# Patient Record
Sex: FEMALE | Race: BLACK OR AFRICAN AMERICAN | Employment: STUDENT | URBAN - METROPOLITAN AREA
[De-identification: names, ages, dates, MRNs, and addresses within clinical notes are randomized per-mention and may not be internally consistent; named-entity substitution may affect disease eponyms.]

---
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
## 2024-01-11 ENCOUNTER — ROUTINE PRENATAL (OUTPATIENT)
Dept: OBGYN | Age: 19
End: 2024-01-11
Payer: COMMERCIAL

## 2024-01-11 VITALS
WEIGHT: 123.7 LBS | SYSTOLIC BLOOD PRESSURE: 102 MMHG | DIASTOLIC BLOOD PRESSURE: 55 MMHG | BODY MASS INDEX: 21.23 KG/M2 | HEART RATE: 96 BPM

## 2024-01-11 DIAGNOSIS — Z59.00 HOMELESS: ICD-10-CM

## 2024-01-11 DIAGNOSIS — Z34.91 PRENATAL CARE, FIRST TRIMESTER: Primary | ICD-10-CM

## 2024-01-11 PROCEDURE — 99212 OFFICE O/P EST SF 10 MIN: CPT | Performed by: MIDWIFE

## 2024-01-11 PROCEDURE — G8484 FLU IMMUNIZE NO ADMIN: HCPCS | Performed by: MIDWIFE

## 2024-01-11 PROCEDURE — 4004F PT TOBACCO SCREEN RCVD TLK: CPT | Performed by: MIDWIFE

## 2024-01-11 PROCEDURE — G8420 CALC BMI NORM PARAMETERS: HCPCS | Performed by: MIDWIFE

## 2024-01-11 PROCEDURE — G8427 DOCREV CUR MEDS BY ELIG CLIN: HCPCS | Performed by: MIDWIFE

## 2024-01-11 SDOH — ECONOMIC STABILITY - HOUSING INSECURITY: HOMELESSNESS UNSPECIFIED: Z59.00

## 2024-01-11 NOTE — PROGRESS NOTES
Keagan Rivera is a  female 12w6d    PAST OB HISTORY  OB History    Para Term  AB Living   1 0 0 0 0 0   SAB IAB Ectopic Molar Multiple Live Births   0 0 0 0 0 0      # Outcome Date GA Lbr Italo/2nd Weight Sex Delivery Anes PTL Lv   1 Current                MEDICAL HISTORY  No past medical history on file.      No bleeding, cramping or leakage of fluid.   She does notreport nausea, vomiting    Pt states she quit smoking     The patient was seen and evaluated.   Alert, cooperative, oriented to time and Place  Abdomen soft non tender  Skin warm and dry, no peripheral edema noted.     Mother's Prenatal Vitals  BP: (!) 102/55  Weight - Scale: 56.1 kg (123 lb 11.2 oz)  Pulse: 96  Patient Position: Sitting    The following labs or Prenatal Panel results reviewed and discussed with patient.     Pre-Janey Labs:  CBC:  Lab Results   Component Value Date    WBC 12.9 (H) 2023    RBC 4.89 2023    HGB 12.3 2023    HCT 35.5 2023    MCV 72.6 (L) 2023    MCH 25.2 (L) 2023    MCHC 34.6 (H) 2023    RDW 18.2 (H) 2023     2023    MPV 9.5 2023     Urinalysis:   Lab Results   Component Value Date    COLORU Yellow 2023    CLARITYU TURBID (A) 2023    GLUCOSEU Negative 2023    BILIRUBINUR NEGATIVE 2023    KETUA TRACE (A) 2023    SPECGRAV 1.020 2023    BLOODU Negative 2023    PHUR 7.0 2023    PROTEINU Negative 2023    UROBILINOGEN 0.2 2023    NITRU NEGATIVE 2023    LEUKOCYTESUR NEGATIVE 2023    WBCUA 0 TO 5 2023    RBCUA 0 TO 2 2023    BACTERIA 1+ (A) 2023     TSH:  No results found for: \"TSH\"  RPR:  Lab Results   Component Value Date    LABRPR NONREACTIVE 2023     RUBELLA:  No components found for: \"RUBELLAIGGANTIBODY\"  HIV:  No results found for: \"HIV1X2\"  HEP B:  Lab Results   Component Value Date    HEPBSAG NONREACTIVE 2023     GC PROBE RNA: No results

## 2024-01-11 NOTE — PROGRESS NOTES
Patient alert and pleasant with no complaints.  Here today for routine prenatal visit.  Discharge instructions have been discussed with the patient. Patient advised to call our office with any questions or concerns.   Voiced understanding.

## 2024-01-12 PROBLEM — Z59.00 HOMELESS: Status: ACTIVE | Noted: 2024-01-12

## 2024-01-14 ENCOUNTER — APPOINTMENT (OUTPATIENT)
Dept: ULTRASOUND IMAGING | Age: 19
End: 2024-01-14
Payer: COMMERCIAL

## 2024-01-14 ENCOUNTER — HOSPITAL ENCOUNTER (EMERGENCY)
Age: 19
Discharge: HOME | End: 2024-01-14
Payer: COMMERCIAL

## 2024-01-14 VITALS
HEART RATE: 91 BPM | DIASTOLIC BLOOD PRESSURE: 72 MMHG | TEMPERATURE: 97.5 F | SYSTOLIC BLOOD PRESSURE: 112 MMHG | WEIGHT: 123 LBS | HEIGHT: 64 IN | BODY MASS INDEX: 21 KG/M2 | OXYGEN SATURATION: 100 % | RESPIRATION RATE: 14 BRPM

## 2024-01-14 DIAGNOSIS — Z36.9 ENCOUNTER FOR FETAL ULTRASOUND: ICD-10-CM
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
## 2024-01-14 PROCEDURE — 99284 EMERGENCY DEPT VISIT MOD MDM: CPT

## 2024-01-14 PROCEDURE — 76801 OB US < 14 WKS SINGLE FETUS: CPT

## 2024-01-14 PROCEDURE — 6370000000 HC RX 637 (ALT 250 FOR IP): Performed by: NURSE PRACTITIONER

## 2024-01-14 RX ORDER — ACETAMINOPHEN 325 MG/1
650 TABLET ORAL ONCE
Status: COMPLETED | OUTPATIENT
Start: 2024-01-14 | End: 2024-01-14

## 2024-01-14 RX ADMIN — ACETAMINOPHEN 650 MG: 325 TABLET ORAL at 12:33

## 2024-01-14 ASSESSMENT — PAIN - FUNCTIONAL ASSESSMENT: PAIN_FUNCTIONAL_ASSESSMENT: NONE - DENIES PAIN
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
## 2024-01-14 NOTE — DISCHARGE INSTRUCTIONS
NORMAL PREGNANCY ULTRASOUND.  TYLENOL FOR PAIN.  FOLLOW UP WITH OBGYN.  FOLLOW UP WITH YOUR PRIMARY DOCTOR. IF YOU DO NOT HAVE ONE, USE THE INFORMATION ATTACHED TO ESTABLISH CARE.  RETURN FOR WORSENING SYMPTOMS.

## 2024-01-15 ENCOUNTER — TELEPHONE (OUTPATIENT)
Dept: OBGYN | Age: 19
End: 2024-01-15

## 2024-01-15 NOTE — TELEPHONE ENCOUNTER
Patient called regarding housing. Patient voiced she is currently homeless and in need of housing. I explained [facility] program and patient voiced she was interested in enrolling. I made a referral out to [facility] for assistance. I also talked with patient about the [facility] and the assistance they provide. Patient was interested in getting on the wait list.I tried calling [facility] but no response. I will try calling the shelter again today.

## 2024-01-24 ENCOUNTER — TELEPHONE (OUTPATIENT)
Dept: INTERNAL MEDICINE | Age: 19
End: 2024-01-24

## 2024-01-24 NOTE — TELEPHONE ENCOUNTER
LAW made contact to pt related to referral. Pt is currently staying with her boyfriend's sister. Pt reports she needs assistance with housing resources. [facility] to route to Medical Social Work. Pt reported no behavioral health needs at this time. [facility] to route to hospitals

## 2024-02-07 ENCOUNTER — ANCILLARY PROCEDURE (OUTPATIENT)
Dept: OBGYN CLINIC | Age: 19
End: 2024-02-07
Payer: COMMERCIAL

## 2024-02-07 ENCOUNTER — INITIAL PRENATAL (OUTPATIENT)
Dept: OBGYN CLINIC | Age: 19
End: 2024-02-07
Payer: COMMERCIAL

## 2024-02-07 VITALS
BODY MASS INDEX: 21.63 KG/M2 | DIASTOLIC BLOOD PRESSURE: 65 MMHG | HEART RATE: 69 BPM | WEIGHT: 126 LBS | SYSTOLIC BLOOD PRESSURE: 99 MMHG

## 2024-02-07 DIAGNOSIS — F12.10 MARIJUANA ABUSE: ICD-10-CM

## 2024-02-07 DIAGNOSIS — Z72.0 VAPES NICOTINE CONTAINING SUBSTANCE: ICD-10-CM

## 2024-02-07 DIAGNOSIS — F12.90 MARIJUANA USE DURING PREGNANCY: ICD-10-CM

## 2024-02-07 DIAGNOSIS — O09.891 HIGH RISK TEEN PREGNANCY IN FIRST TRIMESTER: ICD-10-CM

## 2024-02-07 DIAGNOSIS — O99.312: Primary | ICD-10-CM

## 2024-02-07 DIAGNOSIS — O99.320 MARIJUANA USE DURING PREGNANCY: ICD-10-CM

## 2024-02-07 DIAGNOSIS — O99.311 ALCOHOL CONSUMPTION DURING PREGNANCY IN FIRST TRIMESTER: ICD-10-CM

## 2024-02-07 DIAGNOSIS — O35.BXX0 FETAL CARDIAC ECHOGENIC FOCUS, ANTEPARTUM, SINGLE OR UNSPECIFIED FETUS: ICD-10-CM

## 2024-02-07 DIAGNOSIS — O09.892 HIGH RISK TEEN PREGNANCY IN SECOND TRIMESTER: ICD-10-CM

## 2024-02-07 DIAGNOSIS — Z3A.16 16 WEEKS GESTATION OF PREGNANCY: ICD-10-CM

## 2024-02-07 DIAGNOSIS — Z59.00 HOMELESS: ICD-10-CM

## 2024-02-07 DIAGNOSIS — D58.2 HEMOGLOBIN C TRAIT (HCC): ICD-10-CM

## 2024-02-07 DIAGNOSIS — D50.9 HYPOCHROMIC MICROCYTIC ANEMIA: ICD-10-CM

## 2024-02-07 LAB
GLUCOSE URINE, POC: NEGATIVE
PROTEIN UA: NEGATIVE

## 2024-02-07 PROCEDURE — 81002 URINALYSIS NONAUTO W/O SCOPE: CPT | Performed by: OBSTETRICS & GYNECOLOGY

## 2024-02-07 PROCEDURE — 76805 OB US >/= 14 WKS SNGL FETUS: CPT | Performed by: OBSTETRICS & GYNECOLOGY

## 2024-02-07 PROCEDURE — 36415 COLL VENOUS BLD VENIPUNCTURE: CPT | Performed by: OBSTETRICS & GYNECOLOGY

## 2024-02-07 PROCEDURE — 99203 OFFICE O/P NEW LOW 30 MIN: CPT | Performed by: OBSTETRICS & GYNECOLOGY

## 2024-02-07 SDOH — ECONOMIC STABILITY - HOUSING INSECURITY: HOMELESSNESS UNSPECIFIED: Z59.00

## 2024-02-07 NOTE — PROGRESS NOTES
24    Edwige Cunha APRN - CNM  [address] [place] [address]     RE:  KEAGAN SHEIKH  : 2005   AGE: 18 y.o.    This report has been created using voice recognition software. It may contain errors which are inherent in voice recognition technology.    Dear Dr. Cunha:    Keagan Sheikh had an appointment today for the following indications:    Patient Active Problem List   Diagnosis    Marijuana use during pregnancy    Alcohol consumption during pregnancy in first trimester    Vapes nicotine containing substance    High risk teen pregnancy in first trimester    Homeless    Fetal cardiac echogenic focus, antepartum    Hemoglobin C trait (HCC)     Keagan Sheikh is a 18 y.o. female, who is G1(0). She has an Estimated Date of Delivery: 24 based on her established dates.  She is currently 16 weeks 5 days gestation based on that assessment.     I discussed the [id number] screen with the patient. I advised her that this a screening test not a diagnostic test. I advised her that the test screens only for trisomy 21, 18 and 13. We discussed the sensitivity of the test which I advised the patient is as follows:      99.1% for detection of trisomy 21 with a specificity of 99.9%   >99.9% for trisomy 18 with a specificity of 99.6%     91.7% for trisomy 13 with a specificity of 99.7%     The patient understands that she may be a candidate for diagnostic genetic testing regardless of the results of her screen. Genetics. She understands that the [id number] testing does not replace diagnostic genetic testing.      Limitations of the test as stated by [employer]:  While the results of these tests are highly reliable, discordant results, including an accurate fetal sex prediction, may occur due to placental, maternal or fetal mosaicism or neoplasm, vanishing twin; and or maternal organ transplant, or other causes.  These tests are screening tests and not diagnostic.  They do not

## 2024-02-07 NOTE — PROGRESS NOTES
Pt here as a new patient.  Pt needs a prenatal sent to her pharmacy and something for BV and yeast. Pt denies bleeding/cramping/lof.

## 2024-02-09 RX ORDER — PNV NO.95/FERROUS FUM/FOLIC AC 28MG-0.8MG
1 TABLET ORAL
Qty: 30 TABLET | Refills: 12 | Status: CANCELLED | OUTPATIENT
Start: 2024-02-09

## 2024-02-09 RX ORDER — PNV NO.95/FERROUS FUM/FOLIC AC 28MG-0.8MG
1 TABLET ORAL
Qty: 30 TABLET | Refills: 12 | Status: SHIPPED | OUTPATIENT
Start: 2024-02-09

## 2024-02-09 NOTE — TELEPHONE ENCOUNTER
Patient called she had initial prenatal appt 2/7 with [facility].  Patient states she was supposed to be prescribed prenatal, something for yeast infection and BV

## 2024-02-13 ENCOUNTER — ROUTINE PRENATAL (OUTPATIENT)
Dept: OBGYN | Age: 19
End: 2024-02-13
Payer: COMMERCIAL

## 2024-02-13 VITALS
HEART RATE: 104 BPM | SYSTOLIC BLOOD PRESSURE: 110 MMHG | DIASTOLIC BLOOD PRESSURE: 69 MMHG | WEIGHT: 130.4 LBS | BODY MASS INDEX: 22.38 KG/M2

## 2024-02-13 DIAGNOSIS — O23.592 VAGINITIS AFFECTING PREGNANCY IN SECOND TRIMESTER, ANTEPARTUM: Primary | ICD-10-CM

## 2024-02-13 DIAGNOSIS — Z3A.17 17 WEEKS GESTATION OF PREGNANCY: ICD-10-CM

## 2024-02-13 LAB
GLUCOSE URINE, POC: NEGATIVE
PROTEIN UA: NEGATIVE

## 2024-02-13 PROCEDURE — 1036F TOBACCO NON-USER: CPT | Performed by: NURSE PRACTITIONER

## 2024-02-13 PROCEDURE — G8427 DOCREV CUR MEDS BY ELIG CLIN: HCPCS | Performed by: NURSE PRACTITIONER

## 2024-02-13 PROCEDURE — G8484 FLU IMMUNIZE NO ADMIN: HCPCS | Performed by: NURSE PRACTITIONER

## 2024-02-13 PROCEDURE — G8420 CALC BMI NORM PARAMETERS: HCPCS | Performed by: NURSE PRACTITIONER

## 2024-02-13 PROCEDURE — 99213 OFFICE O/P EST LOW 20 MIN: CPT | Performed by: NURSE PRACTITIONER

## 2024-02-13 PROCEDURE — 81002 URINALYSIS NONAUTO W/O SCOPE: CPT | Performed by: NURSE PRACTITIONER

## 2024-02-13 RX ORDER — METRONIDAZOLE 500 MG/1
500 TABLET ORAL 2 TIMES DAILY
Qty: 14 TABLET | Refills: 0 | Status: SHIPPED | OUTPATIENT
Start: 2024-02-13 | End: 2024-02-20

## 2024-02-13 NOTE — PROGRESS NOTES
Patient alert and pleasant with complaints of discharge and odor.  Here today for routine prenatal visit.  [facility] Rx specimen obtained, labeled and sent to lab. Discharge instructions have been discussed with the patient. Patient advised to call our office with any questions or concerns. Voiced understanding.

## 2024-02-13 NOTE — PROGRESS NOTES
Keagan Rivera is a  female 17w4d    She has complaints today of vaginal discharge, itching and odor. She states she gets reccurrent bv however the itching is new. She denies cramping or vaginal bleeding.   PAST OB HISTORY  OB History    Para Term  AB Living   1 0 0 0 0 0   SAB IAB Ectopic Molar Multiple Live Births   0 0 0 0 0 0      # Outcome Date GA Lbr Italo/2nd Weight Sex Delivery Anes PTL Lv   1 Current                MEDICAL HISTORY  Past Medical History:   Diagnosis Date    Migraine         There was Positive fetal movements. No bleeding, cramping or leakage of fluid.   She does notreport nausea, vomiting    The patient was seen and evaluated.   Alert, cooperative, oriented to time and Place  Abdomen soft non tender  Skin warm and dry, no peripheral edema noted.          The following labs or Prenatal Panel results reviewed and discussed with patient.     Pre- Labs:  CBC:  Lab Results   Component Value Date    WBC 12.9 (H) 2023    RBC 4.89 2023    HGB 12.3 2023    HCT 35.5 2023    MCV 72.6 (L) 2023    MCH 25.2 (L) 2023    MCHC 34.6 (H) 2023    RDW 18.2 (H) 2023     2023    MPV 9.5 2023     Urinalysis:   Lab Results   Component Value Date    COLORU Yellow 2023    CLARITYU TURBID (A) 2023    GLUCOSEU Negative 2024    BILIRUBINUR NEGATIVE 2023    KETUA TRACE (A) 2023    SPECGRAV 1.020 2023    BLOODU Negative 2023    PHUR 7.0 2023    PROTEINU Negative 2024    UROBILINOGEN 0.2 2023    NITRU NEGATIVE 2023    LEUKOCYTESUR NEGATIVE 2023    WBCUA 0 TO 5 2023    RBCUA 0 TO 2 2023    BACTERIA 1+ (A) 2023     TSH:  No results found for: \"TSH\"  RPR:  Lab Results   Component Value Date    LABRPR NONREACTIVE 2023     RUBELLA:  No components found for: \"RUBELLAIGGANTIBODY\"  HIV:  No results found for: \"HIV1X2\"  HEP B:  Lab Results

## 2024-02-13 NOTE — PATIENT INSTRUCTIONS
Housing for elderly, disabled, handicapped, moderate and low-income families; rental assistance under [address] program (Housing Choice Voucher Program). Call for application information.  [facility] Phone number: [phone]  Website: [employer]  [facility] Phone Number: [phone]  Website: [employer]  [facility] Phone Number: [phone]  Website: [employer]    [facility]:  What they offer: shelter for victims of domestic violence in [facility]  Phone Number: [phone]  Website: [employer]    [facility] DOMESTIC VIOLENCE SERVICES:  What they offer: shelter for victims of domestic violence in [facility]  Phone Number: [phone]  Website: [email or web address][email or web address]    HOMELESS PRO[facility]  What they offer: Assists homeless persons or families that lack a fixed or regular nighttime residence; shelter houses homeless from 3-30 days. Extended stay optional depending upon housing situation  PHONE Number: [phone], [phone]  Website: [employer]aaofcc[employer][email or web address]    [facility]:  What they offer: Temporary shelter for homeless persons or families  Phone: [phone]  Website: rescuemissionmv.[email or web address]    [facility]:  What they offer: Temporary shelter with hot meals for homeless persons 18 and older and for families.  Phone: [phone]  Website: [employer]  [employer]  What they offer: Emergency shelter for LGBTQ+ community and other resources  Contact: [phone]; [address]:   What they offer: Disaster Relief, Domestic Violence, Emergency Assistance, Family Support, Senior Support    [facility]  Contact: [phone]; [address] [place] [address]    [facility]  Contact: [phone]    [facility]  Contact: [phone]  [facility] Food Resources*     [facility]:    What they do: Provides 24-hr, 7 days a week

## 2024-02-14 ENCOUNTER — TELEPHONE (OUTPATIENT)
Dept: OBGYN | Age: 19
End: 2024-02-14

## 2024-02-15 RX ORDER — PNV NO.95/FERROUS FUM/FOLIC AC 28MG-0.8MG
1 TABLET ORAL
Qty: 30 TABLET | Refills: 12 | Status: SHIPPED | OUTPATIENT
Start: 2024-02-15

## 2024-03-01 ENCOUNTER — HOSPITAL ENCOUNTER (OUTPATIENT)
Age: 19
Discharge: HOME | End: 2024-03-01
Payer: COMMERCIAL

## 2024-03-01 DIAGNOSIS — D58.2 HEMOGLOBIN C TRAIT (HCC): ICD-10-CM

## 2024-03-01 DIAGNOSIS — D50.9 HYPOCHROMIC MICROCYTIC ANEMIA: ICD-10-CM

## 2024-03-01 LAB
ALBUMIN SERPL-MCNC: 3.7 G/DL (ref 3.5–5.2)
ALP SERPL-CCNC: 59 U/L (ref 35–104)
ALT SERPL-CCNC: 84 U/L (ref 0–32)
ANION GAP SERPL CALCULATED.3IONS-SCNC: 15 MMOL/L (ref 7–16)
AST SERPL-CCNC: 70 U/L (ref 0–31)
BILIRUB SERPL-MCNC: 0.5 MG/DL (ref 0–1.2)
BUN SERPL-MCNC: 3 MG/DL (ref 6–20)
CALCIUM SERPL-MCNC: 9.2 MG/DL (ref 8.6–10.2)
CHLORIDE SERPL-SCNC: 100 MMOL/L (ref 98–107)
CO2 SERPL-SCNC: 19 MMOL/L (ref 22–29)
CREAT SERPL-MCNC: 0.5 MG/DL (ref 0.5–1)
FERRITIN SERPL-MCNC: 11 NG/ML
FOLATE SERPL-MCNC: 14.4 NG/ML (ref 4.8–24.2)
GFR SERPL CREATININE-BSD FRML MDRD: >60 ML/MIN/1.73M2
GLUCOSE SERPL-MCNC: 98 MG/DL (ref 74–99)
IMM RETICS NFR: 27.3 % (ref 3–15.9)
IRON SATN MFR SERPL: 8 % (ref 15–50)
IRON SERPL-MCNC: 34 UG/DL (ref 37–145)
POTASSIUM SERPL-SCNC: 3.5 MMOL/L (ref 3.5–5)
PROT SERPL-MCNC: 7.1 G/DL (ref 6.4–8.3)
RETIC HEMOGLOBIN: 29.7 PG (ref 28.2–36.6)
RETICS # AUTO: 0.09 M/UL
RETICS/RBC NFR AUTO: 2.3 % (ref 0.4–1.9)
SODIUM SERPL-SCNC: 134 MMOL/L (ref 132–146)
TIBC SERPL-MCNC: 413 UG/DL (ref 250–450)
VIT B12 SERPL-MCNC: 401 PG/ML (ref 211–946)

## 2024-03-01 PROCEDURE — 80053 COMPREHEN METABOLIC PANEL: CPT

## 2024-03-01 PROCEDURE — 82607 VITAMIN B-12: CPT

## 2024-03-01 PROCEDURE — 83550 IRON BINDING TEST: CPT

## 2024-03-01 PROCEDURE — 83540 ASSAY OF IRON: CPT

## 2024-03-01 PROCEDURE — 36415 COLL VENOUS BLD VENIPUNCTURE: CPT

## 2024-03-01 PROCEDURE — 85045 AUTOMATED RETICULOCYTE COUNT: CPT

## 2024-03-01 PROCEDURE — 82746 ASSAY OF FOLIC ACID SERUM: CPT

## 2024-03-01 PROCEDURE — 82728 ASSAY OF FERRITIN: CPT

## 2024-03-04 DIAGNOSIS — D50.9 IRON DEFICIENCY ANEMIA, UNSPECIFIED IRON DEFICIENCY ANEMIA TYPE: Primary | ICD-10-CM

## 2024-03-04 RX ORDER — ALBUTEROL SULFATE 90 UG/1
4 AEROSOL, METERED RESPIRATORY (INHALATION) PRN
OUTPATIENT
Start: 2024-03-04

## 2024-03-04 RX ORDER — DIPHENHYDRAMINE HYDROCHLORIDE 50 MG/ML
50 INJECTION INTRAMUSCULAR; INTRAVENOUS
OUTPATIENT
Start: 2024-03-04

## 2024-03-04 RX ORDER — ACETAMINOPHEN 325 MG/1
650 TABLET ORAL
OUTPATIENT
Start: 2024-03-04

## 2024-03-04 RX ORDER — EPINEPHRINE 1 MG/ML
0.3 INJECTION, SOLUTION, CONCENTRATE INTRAVENOUS PRN
OUTPATIENT
Start: 2024-03-04

## 2024-03-04 RX ORDER — SODIUM CHLORIDE 0.9 % (FLUSH) 0.9 %
5-40 SYRINGE (ML) INJECTION PRN
OUTPATIENT
Start: 2024-03-04

## 2024-03-04 RX ORDER — ONDANSETRON 2 MG/ML
8 INJECTION INTRAMUSCULAR; INTRAVENOUS
OUTPATIENT
Start: 2024-03-04

## 2024-03-04 RX ORDER — SODIUM CHLORIDE 9 MG/ML
INJECTION, SOLUTION INTRAVENOUS CONTINUOUS
OUTPATIENT
Start: 2024-03-04

## 2024-03-06 ENCOUNTER — ROUTINE PRENATAL (OUTPATIENT)
Dept: OBGYN CLINIC | Age: 19
End: 2024-03-06
Payer: COMMERCIAL

## 2024-03-06 ENCOUNTER — ANCILLARY PROCEDURE (OUTPATIENT)
Dept: OBGYN CLINIC | Age: 19
End: 2024-03-06
Payer: COMMERCIAL

## 2024-03-06 VITALS
DIASTOLIC BLOOD PRESSURE: 70 MMHG | HEIGHT: 64 IN | SYSTOLIC BLOOD PRESSURE: 106 MMHG | HEART RATE: 83 BPM | WEIGHT: 133 LBS | OXYGEN SATURATION: 98 % | BODY MASS INDEX: 22.71 KG/M2

## 2024-03-06 DIAGNOSIS — O99.320 MARIJUANA USE DURING PREGNANCY: ICD-10-CM

## 2024-03-06 DIAGNOSIS — R74.8 ELEVATED LIVER ENZYMES: ICD-10-CM

## 2024-03-06 DIAGNOSIS — R82.5 POSITIVE URINE DRUG SCREEN: ICD-10-CM

## 2024-03-06 DIAGNOSIS — D58.2 HEMOGLOBIN C TRAIT (HCC): ICD-10-CM

## 2024-03-06 DIAGNOSIS — O35.BXX0 FETAL CARDIAC ECHOGENIC FOCUS, ANTEPARTUM, SINGLE OR UNSPECIFIED FETUS: ICD-10-CM

## 2024-03-06 DIAGNOSIS — D50.8 IRON DEFICIENCY ANEMIA SECONDARY TO INADEQUATE DIETARY IRON INTAKE: ICD-10-CM

## 2024-03-06 DIAGNOSIS — O09.892 HIGH RISK TEEN PREGNANCY IN SECOND TRIMESTER: ICD-10-CM

## 2024-03-06 DIAGNOSIS — D50.9 HYPOCHROMIC MICROCYTIC ANEMIA: ICD-10-CM

## 2024-03-06 DIAGNOSIS — O99.311 ALCOHOL CONSUMPTION DURING PREGNANCY IN FIRST TRIMESTER: Primary | ICD-10-CM

## 2024-03-06 DIAGNOSIS — Z72.0 VAPES NICOTINE CONTAINING SUBSTANCE: ICD-10-CM

## 2024-03-06 DIAGNOSIS — F12.90 MARIJUANA USE DURING PREGNANCY: ICD-10-CM

## 2024-03-06 DIAGNOSIS — O09.891 HIGH RISK TEEN PREGNANCY IN FIRST TRIMESTER: ICD-10-CM

## 2024-03-06 LAB
GLUCOSE URINE, POC: NORMAL
PROTEIN UA: NEGATIVE

## 2024-03-06 PROCEDURE — 1036F TOBACCO NON-USER: CPT | Performed by: OBSTETRICS & GYNECOLOGY

## 2024-03-06 PROCEDURE — 76811 OB US DETAILED SNGL FETUS: CPT | Performed by: OBSTETRICS & GYNECOLOGY

## 2024-03-06 PROCEDURE — 76817 TRANSVAGINAL US OBSTETRIC: CPT | Performed by: OBSTETRICS & GYNECOLOGY

## 2024-03-06 PROCEDURE — 81002 URINALYSIS NONAUTO W/O SCOPE: CPT | Performed by: OBSTETRICS & GYNECOLOGY

## 2024-03-06 PROCEDURE — 99213 OFFICE O/P EST LOW 20 MIN: CPT | Performed by: OBSTETRICS & GYNECOLOGY

## 2024-03-06 PROCEDURE — G8427 DOCREV CUR MEDS BY ELIG CLIN: HCPCS | Performed by: OBSTETRICS & GYNECOLOGY

## 2024-03-06 PROCEDURE — G8420 CALC BMI NORM PARAMETERS: HCPCS | Performed by: OBSTETRICS & GYNECOLOGY

## 2024-03-06 PROCEDURE — G8484 FLU IMMUNIZE NO ADMIN: HCPCS | Performed by: OBSTETRICS & GYNECOLOGY

## 2024-03-06 PROCEDURE — 99215 OFFICE O/P EST HI 40 MIN: CPT | Performed by: OBSTETRICS & GYNECOLOGY

## 2024-03-06 NOTE — PROGRESS NOTES
Patient here for prenatal ultrasound  No complaints  +FM noted  
dipstick:   Negative for Glucose    Negative for Albumin    GENERAL:   The patient is a well developed, female who is alert cooperative and oriented times three in no acute distress.    HEENT:  Normo cephalic and atraumatic. No facial edema.     ABDOMEN:   Her uterus is gravid. She had no complaint of abdominal pain or tenderness. The fetal heart rate is 151 bpm. The fetus was in the cephalic presentation which was confirmed by the ultrasound assessment.    PELVIC EXAMINATION:  Transvaginal ultrasound assessment of the cervix was performed. The cervical length was 38.3 mm, without funneling of the amniotic membranes.     EXTREMITIES:  No peripheral edema is noted.     IMPRESSION:    1.  IUP at 20 weeks 5 days Estimated Date of Delivery: 7/19/24    2.  MaterniT 21 screen showed no increased risk for fetal aneuploidy for the chromosomes assessed.    3.  Nicotine vaper use    4.  Alcohol use in pregnancy     5.  Marijuana use in pregnancy     6.  Hemoglobin C trait.  31.9% HbC identified on electrophoresis    7.  Father to the baby stated he does not have a hemoglobinopathy    8.  Hypochromic, microcytic anemia    9.  Low serum ferritin 3/1/2024   10.  Elevated liver enzymes 3/1/2024  11.  Normal cervical length without funneling of the amniotic membranes 3/6/2024    PLAN:  A fetal echocardiogram is scheduled with Dr. Nancy Sinclair and Dr. Donaldo Kyle.    Additional laboratory testing was ordered on 3/6/2024 including a repeat CMP, CBC, thiamine level, APTT, PT/INR, fibrinogen, urine analysis with culture and sensitivity, and protein creatinine ratio.    The patient will be scheduled for outpatient iron infusion therapy, if approved by her insurance carrier.  I also requested that she receive 1 amp of MVI every other infusion.    The patient was advised to call if she has any increased vaginal discharge, vaginal bleeding, contractions, abdominal pain, back pain or any new significant symptomatology prior to her

## 2024-03-06 NOTE — PATIENT INSTRUCTIONS
Please arrive for your scheduled appointment at least 15 minutes early with your actual insurance card+ a photo ID. Also if you need any refills ordered or have questions, it may take up 48 hours to reply. Please allow ample time for your refills. Call me when you use last refill. Thank you for your cooperation. You might be having an NST at your next appt. Please eat a large snack or breakfast before coming to office. Thank youCall your primary obstetrician with bleeding, leaking of fluid, abdominal tenderness, headache, blurry vision, epigastric pain and increased urinary frequency.Any questions contact Arlette at [phone].If you are experiencing an emergency and need immediate help, call 911 or go to go emergency room or labor and delivery. If you are experiencing an emergency and need immediate help, call 911 or go to go emergency room or labor and delivery.

## 2024-03-14 ENCOUNTER — TELEPHONE (OUTPATIENT)
Dept: OBGYN | Age: 19
End: 2024-03-14

## 2024-03-15 ENCOUNTER — TELEPHONE (OUTPATIENT)
Dept: OBGYN | Age: 19
End: 2024-03-15

## 2024-03-20 ENCOUNTER — HOSPITAL ENCOUNTER (OUTPATIENT)
Dept: INFUSION THERAPY | Age: 19
Setting detail: INFUSION SERIES
Discharge: HOME | End: 2024-03-20
Payer: COMMERCIAL

## 2024-03-20 VITALS
TEMPERATURE: 97.3 F | SYSTOLIC BLOOD PRESSURE: 114 MMHG | DIASTOLIC BLOOD PRESSURE: 82 MMHG | OXYGEN SATURATION: 100 % | WEIGHT: 133 LBS | HEIGHT: 64 IN | RESPIRATION RATE: 16 BRPM | BODY MASS INDEX: 22.71 KG/M2 | HEART RATE: 95 BPM

## 2024-03-20 DIAGNOSIS — D50.9 IRON DEFICIENCY ANEMIA, UNSPECIFIED IRON DEFICIENCY ANEMIA TYPE: Primary | ICD-10-CM

## 2024-03-20 PROCEDURE — 6360000002 HC RX W HCPCS: Performed by: OBSTETRICS & GYNECOLOGY

## 2024-03-20 PROCEDURE — 2580000003 HC RX 258: Performed by: OBSTETRICS & GYNECOLOGY

## 2024-03-20 PROCEDURE — 96374 THER/PROPH/DIAG INJ IV PUSH: CPT

## 2024-03-20 RX ORDER — ONDANSETRON 2 MG/ML
8 INJECTION INTRAMUSCULAR; INTRAVENOUS
Status: CANCELLED | OUTPATIENT
Start: 2024-03-27

## 2024-03-20 RX ORDER — SODIUM CHLORIDE 0.9 % (FLUSH) 0.9 %
5-40 SYRINGE (ML) INJECTION PRN
Status: CANCELLED | OUTPATIENT
Start: 2024-03-27

## 2024-03-20 RX ORDER — EPINEPHRINE 1 MG/ML
0.3 INJECTION, SOLUTION, CONCENTRATE INTRAVENOUS PRN
Status: CANCELLED | OUTPATIENT
Start: 2024-03-27

## 2024-03-20 RX ORDER — SODIUM CHLORIDE 0.9 % (FLUSH) 0.9 %
5-40 SYRINGE (ML) INJECTION PRN
Status: DISCONTINUED | OUTPATIENT
Start: 2024-03-20 | End: 2024-03-21 | Stop reason: HOSPADM

## 2024-03-20 RX ORDER — ALBUTEROL SULFATE 90 UG/1
4 AEROSOL, METERED RESPIRATORY (INHALATION) PRN
Status: CANCELLED | OUTPATIENT
Start: 2024-03-27

## 2024-03-20 RX ORDER — DIPHENHYDRAMINE HYDROCHLORIDE 50 MG/ML
50 INJECTION INTRAMUSCULAR; INTRAVENOUS
Status: CANCELLED | OUTPATIENT
Start: 2024-03-27

## 2024-03-20 RX ORDER — ACETAMINOPHEN 325 MG/1
650 TABLET ORAL
Status: CANCELLED | OUTPATIENT
Start: 2024-03-27

## 2024-03-20 RX ORDER — SODIUM CHLORIDE 9 MG/ML
INJECTION, SOLUTION INTRAVENOUS CONTINUOUS
Status: CANCELLED | OUTPATIENT
Start: 2024-03-27

## 2024-03-20 RX ADMIN — SODIUM CHLORIDE, PRESERVATIVE FREE 10 ML: 5 INJECTION INTRAVENOUS at 14:05

## 2024-03-20 RX ADMIN — SODIUM CHLORIDE, PRESERVATIVE FREE 10 ML: 5 INJECTION INTRAVENOUS at 14:10

## 2024-03-20 RX ADMIN — IRON SUCROSE 200 MG: 20 INJECTION, SOLUTION INTRAVENOUS at 14:13

## 2024-03-27 ENCOUNTER — HOSPITAL ENCOUNTER (OUTPATIENT)
Dept: INFUSION THERAPY | Age: 19
Setting detail: INFUSION SERIES
Discharge: HOME | End: 2024-03-27
Payer: COMMERCIAL

## 2024-03-27 VITALS
SYSTOLIC BLOOD PRESSURE: 109 MMHG | DIASTOLIC BLOOD PRESSURE: 68 MMHG | RESPIRATION RATE: 16 BRPM | OXYGEN SATURATION: 100 % | HEART RATE: 91 BPM | TEMPERATURE: 97.6 F

## 2024-03-27 DIAGNOSIS — D50.9 IRON DEFICIENCY ANEMIA, UNSPECIFIED IRON DEFICIENCY ANEMIA TYPE: Primary | ICD-10-CM

## 2024-03-27 PROCEDURE — 96374 THER/PROPH/DIAG INJ IV PUSH: CPT

## 2024-03-27 PROCEDURE — 6360000002 HC RX W HCPCS: Performed by: OBSTETRICS & GYNECOLOGY

## 2024-03-27 PROCEDURE — 2580000003 HC RX 258: Performed by: OBSTETRICS & GYNECOLOGY

## 2024-03-27 RX ORDER — ALBUTEROL SULFATE 90 UG/1
4 AEROSOL, METERED RESPIRATORY (INHALATION) PRN
Status: CANCELLED | OUTPATIENT
Start: 2024-04-03

## 2024-03-27 RX ORDER — SODIUM CHLORIDE 9 MG/ML
INJECTION, SOLUTION INTRAVENOUS CONTINUOUS
Status: CANCELLED | OUTPATIENT
Start: 2024-04-03

## 2024-03-27 RX ORDER — ACETAMINOPHEN 325 MG/1
650 TABLET ORAL
Status: CANCELLED | OUTPATIENT
Start: 2024-04-03

## 2024-03-27 RX ORDER — SODIUM CHLORIDE 0.9 % (FLUSH) 0.9 %
5-40 SYRINGE (ML) INJECTION PRN
Status: CANCELLED | OUTPATIENT
Start: 2024-04-03

## 2024-03-27 RX ORDER — EPINEPHRINE 1 MG/ML
0.3 INJECTION, SOLUTION, CONCENTRATE INTRAVENOUS PRN
Status: CANCELLED | OUTPATIENT
Start: 2024-04-03

## 2024-03-27 RX ORDER — DIPHENHYDRAMINE HYDROCHLORIDE 50 MG/ML
50 INJECTION INTRAMUSCULAR; INTRAVENOUS
Status: CANCELLED | OUTPATIENT
Start: 2024-04-03

## 2024-03-27 RX ORDER — SODIUM CHLORIDE 0.9 % (FLUSH) 0.9 %
5-40 SYRINGE (ML) INJECTION PRN
Status: DISCONTINUED | OUTPATIENT
Start: 2024-03-27 | End: 2024-03-28 | Stop reason: HOSPADM

## 2024-03-27 RX ORDER — ONDANSETRON 2 MG/ML
8 INJECTION INTRAMUSCULAR; INTRAVENOUS
Status: CANCELLED | OUTPATIENT
Start: 2024-04-03

## 2024-03-27 RX ADMIN — SODIUM CHLORIDE, PRESERVATIVE FREE 10 ML: 5 INJECTION INTRAVENOUS at 13:56

## 2024-03-27 RX ADMIN — IRON SUCROSE 200 MG: 20 INJECTION, SOLUTION INTRAVENOUS at 14:06

## 2024-03-28 ENCOUNTER — TELEPHONE (OUTPATIENT)
Dept: OBGYN | Age: 19
End: 2024-03-28

## 2024-03-28 NOTE — PROGRESS NOTES
Centering In Pregnancy: Session 2: Nutrition, Healthy Teeth and Gums    maureentree Rivera is a  female 24w0d    PAST OB HISTORY  OB History    Para Term  AB Living   1 0 0 0 0 0   SAB IAB Ectopic Molar Multiple Live Births   0 0 0 0 0 0      # Outcome Date GA Lbr Italo/2nd Weight Sex Delivery Anes PTL Lv   1 Current                PAST MEDICAL HISTORY  Past Medical History:   Diagnosis Date    Migraine           There was regular fetal movements. No contractions or leakage of fluid.   She currently denies any bleeding, cramping, or contractions. No complaints of abdominal pain.   Vaginal discharge x 5 days with burning. Possible bump on her labia. Denies dysuria or bowel changes.     The patient was seen and evaluated.     Physical Exam:  Alert, cooperative, oriented to time and place  Abdomen soft non-tender  Skin warm and dry, no peripheral edema noted.   See flow sheet       Genetic counseling and testing done and reviewed    Glucola screening was not done yet completed:  Results reviewed.  Glucose testing not done yet.     The patients fetal anatomy ultrasound was completed and reviewed.      Assessment:  IUP 23w6d weeks  Size  = to dates  High Risk Teen Pregnancy   Substance use in pregnancy- hx of binge drinking and alcohol use in 1st trimester, THC  Tobacco use  Hemoglobin C trait  Anemia- iron infusions scheduled   Transaminitis- noted 3/1. Repeat labs ordered by [facility], not yet completed. Liver US scheduled 4/15/24  JANE, PACs- fetal echocardiogram scheduled   Vaginal discharge     Plan:  The patient will return to [facility] in Pregnancy for her next visit in 2 weeks.  Obtain a Maternal Fetal Medicine consult or follow-up for Other Transamnitis, Anemia, JANE, scheduled 24  If needed, obtain a [place] [facility] for Not needed at this time.  Continue Medications as ordered.   The patient was advised to call if she has any increased vaginal discharge, vaginal bleeding, contractions,

## 2024-03-29 ENCOUNTER — ROUTINE PRENATAL (OUTPATIENT)
Dept: OBGYN | Age: 19
End: 2024-03-29
Payer: COMMERCIAL

## 2024-03-29 VITALS
HEART RATE: 86 BPM | WEIGHT: 139.8 LBS | SYSTOLIC BLOOD PRESSURE: 113 MMHG | DIASTOLIC BLOOD PRESSURE: 78 MMHG | BODY MASS INDEX: 24 KG/M2

## 2024-03-29 DIAGNOSIS — O09.892 HIGH RISK TEEN PREGNANCY IN SECOND TRIMESTER: Primary | ICD-10-CM

## 2024-03-29 DIAGNOSIS — Z3A.23 23 WEEKS GESTATION OF PREGNANCY: ICD-10-CM

## 2024-03-29 DIAGNOSIS — N89.8 VAGINAL DISCHARGE: ICD-10-CM

## 2024-03-29 LAB
GLUCOSE URINE, POC: NEGATIVE
PROTEIN UA: NEGATIVE

## 2024-03-29 PROCEDURE — 99078 GROUP HEALTH EDUCATION: CPT | Performed by: FAMILY MEDICINE

## 2024-03-29 PROCEDURE — 99213 OFFICE O/P EST LOW 20 MIN: CPT | Performed by: FAMILY MEDICINE

## 2024-03-29 PROCEDURE — 81002 URINALYSIS NONAUTO W/O SCOPE: CPT | Performed by: FAMILY MEDICINE

## 2024-03-29 PROCEDURE — G8484 FLU IMMUNIZE NO ADMIN: HCPCS | Performed by: FAMILY MEDICINE

## 2024-03-29 PROCEDURE — 1036F TOBACCO NON-USER: CPT | Performed by: FAMILY MEDICINE

## 2024-03-29 PROCEDURE — G8420 CALC BMI NORM PARAMETERS: HCPCS | Performed by: FAMILY MEDICINE

## 2024-03-29 PROCEDURE — G8428 CUR MEDS NOT DOCUMENT: HCPCS | Performed by: FAMILY MEDICINE

## 2024-03-29 RX ORDER — PNV NO.95/FERROUS FUM/FOLIC AC 28MG-0.8MG
1 TABLET ORAL
Qty: 30 TABLET | Refills: 12 | Status: SHIPPED | OUTPATIENT
Start: 2024-03-29

## 2024-03-29 NOTE — PROGRESS NOTES
Lisa DELEON from [facility] Facilitated Centering, patient participated in today's group activity. Session 2 focus was on Goals - Small steps big dreams, Nutrition -[place] Guide, Dental- Important on healthy teeth.

## 2024-03-29 NOTE — PROGRESS NOTES
Patient came to session with her significant other and signed Centering Confidentiality Agreement. Patient seemed uncomfortable and due to conflict of her school schedule I removed patient from centering. Patient mentioned that she will need a car seat. I will refer her to community resources.

## 2024-04-01 ENCOUNTER — HOSPITAL ENCOUNTER (OUTPATIENT)
Age: 19
Discharge: HOME | End: 2024-04-01
Payer: COMMERCIAL

## 2024-04-01 DIAGNOSIS — O09.892 HIGH RISK TEEN PREGNANCY IN SECOND TRIMESTER: ICD-10-CM

## 2024-04-01 DIAGNOSIS — O99.311 ALCOHOL CONSUMPTION DURING PREGNANCY IN FIRST TRIMESTER: ICD-10-CM

## 2024-04-01 DIAGNOSIS — R74.8 ELEVATED LIVER ENZYMES: ICD-10-CM

## 2024-04-01 DIAGNOSIS — N89.8 VAGINAL DISCHARGE: ICD-10-CM

## 2024-04-01 LAB
ALBUMIN SERPL-MCNC: 3.9 G/DL (ref 3.5–5.2)
ALP SERPL-CCNC: 61 U/L (ref 35–104)
ALT SERPL-CCNC: 20 U/L (ref 0–32)
ANION GAP SERPL CALCULATED.3IONS-SCNC: 12 MMOL/L (ref 7–16)
AST SERPL-CCNC: 21 U/L (ref 0–31)
BACTERIA URNS QL MICRO: ABNORMAL
BASOPHILS # BLD: 0.06 K/UL (ref 0–0.2)
BASOPHILS NFR BLD: 0 % (ref 0–2)
BILIRUB SERPL-MCNC: 0.5 MG/DL (ref 0–1.2)
BILIRUB UR QL STRIP: NEGATIVE
BUN SERPL-MCNC: 5 MG/DL (ref 6–20)
CALCIUM SERPL-MCNC: 9.7 MG/DL (ref 8.6–10.2)
CHLORIDE SERPL-SCNC: 102 MMOL/L (ref 98–107)
CLARITY UR: ABNORMAL
CO2 SERPL-SCNC: 24 MMOL/L (ref 22–29)
COLOR UR: YELLOW
CREAT SERPL-MCNC: 0.5 MG/DL (ref 0.5–1)
CREAT UR-MCNC: 51.7 MG/DL (ref 29–226)
EOSINOPHIL # BLD: 0.23 K/UL (ref 0.05–0.5)
EOSINOPHILS RELATIVE PERCENT: 2 % (ref 0–6)
EPI CELLS #/AREA URNS HPF: ABNORMAL /HPF
ERYTHROCYTE [DISTWIDTH] IN BLOOD BY AUTOMATED COUNT: 17.3 % (ref 11.5–15)
FIBRINOGEN PPP-MCNC: 299 MG/DL (ref 200–400)
GFR SERPL CREATININE-BSD FRML MDRD: >90 ML/MIN/1.73M2
GLUCOSE SERPL-MCNC: 83 MG/DL (ref 74–99)
GLUCOSE UR STRIP-MCNC: NEGATIVE MG/DL
HCT VFR BLD AUTO: 33.5 % (ref 34–48)
HGB BLD-MCNC: 12.1 G/DL (ref 11.5–15.5)
HGB UR QL STRIP.AUTO: NEGATIVE
IMM GRANULOCYTES # BLD AUTO: 0.17 K/UL (ref 0–0.58)
IMM GRANULOCYTES NFR BLD: 1 % (ref 0–5)
INR PPP: 1
KETONES UR STRIP-MCNC: NEGATIVE MG/DL
LEUKOCYTE ESTERASE UR QL STRIP: NEGATIVE
LYMPHOCYTES NFR BLD: 2.31 K/UL (ref 1.5–4)
LYMPHOCYTES RELATIVE PERCENT: 15 % (ref 20–42)
MCH RBC QN AUTO: 29.4 PG (ref 26–35)
MCHC RBC AUTO-ENTMCNC: 36.1 G/DL (ref 32–34.5)
MCV RBC AUTO: 81.5 FL (ref 80–99.9)
MONOCYTES NFR BLD: 0.97 K/UL (ref 0.1–0.95)
MONOCYTES NFR BLD: 6 % (ref 2–12)
NEUTROPHILS NFR BLD: 76 % (ref 43–80)
NEUTS SEG NFR BLD: 11.73 K/UL (ref 1.8–7.3)
NITRITE UR QL STRIP: NEGATIVE
PARTIAL THROMBOPLASTIN TIME: 26.6 SEC (ref 24.5–35.1)
PH UR STRIP: 7 [PH] (ref 5–9)
PLATELET # BLD AUTO: 291 K/UL (ref 130–450)
PMV BLD AUTO: 10.2 FL (ref 7–12)
POTASSIUM SERPL-SCNC: 4.1 MMOL/L (ref 3.5–5)
PROT SERPL-MCNC: 6.9 G/DL (ref 6.4–8.3)
PROT UR STRIP-MCNC: NEGATIVE MG/DL
PROTHROMBIN TIME: 11.2 SEC (ref 9.3–12.4)
RBC # BLD AUTO: 4.11 M/UL (ref 3.5–5.5)
RBC #/AREA URNS HPF: ABNORMAL /HPF
SODIUM SERPL-SCNC: 138 MMOL/L (ref 132–146)
SP GR UR STRIP: 1.01 (ref 1–1.03)
TOTAL PROTEIN, URINE: 5 MG/DL (ref 0–12)
URINE TOTAL PROTEIN CREATININE RATIO: 0.09 (ref 0–0.2)
UROBILINOGEN UR STRIP-ACNC: 0.2 EU/DL (ref 0–1)
WBC #/AREA URNS HPF: ABNORMAL /HPF
WBC OTHER # BLD: 15.5 K/UL (ref 4.5–11.5)

## 2024-04-01 PROCEDURE — 84425 ASSAY OF VITAMIN B-1: CPT

## 2024-04-01 PROCEDURE — 36415 COLL VENOUS BLD VENIPUNCTURE: CPT

## 2024-04-01 PROCEDURE — 85730 THROMBOPLASTIN TIME PARTIAL: CPT

## 2024-04-01 PROCEDURE — 80074 ACUTE HEPATITIS PANEL: CPT

## 2024-04-01 PROCEDURE — 84156 ASSAY OF PROTEIN URINE: CPT

## 2024-04-01 PROCEDURE — 85384 FIBRINOGEN ACTIVITY: CPT

## 2024-04-01 PROCEDURE — 81001 URINALYSIS AUTO W/SCOPE: CPT

## 2024-04-01 PROCEDURE — 82570 ASSAY OF URINE CREATININE: CPT

## 2024-04-01 PROCEDURE — 85025 COMPLETE CBC W/AUTO DIFF WBC: CPT

## 2024-04-01 PROCEDURE — 80053 COMPREHEN METABOLIC PANEL: CPT

## 2024-04-01 PROCEDURE — 85610 PROTHROMBIN TIME: CPT

## 2024-04-01 PROCEDURE — 87086 URINE CULTURE/COLONY COUNT: CPT

## 2024-04-02 ENCOUNTER — ANCILLARY PROCEDURE (OUTPATIENT)
Dept: OBGYN CLINIC | Age: 19
End: 2024-04-02
Payer: COMMERCIAL

## 2024-04-02 ENCOUNTER — ROUTINE PRENATAL (OUTPATIENT)
Dept: OBGYN CLINIC | Age: 19
End: 2024-04-02
Payer: COMMERCIAL

## 2024-04-02 ENCOUNTER — TELEPHONE (OUTPATIENT)
Dept: OBGYN | Age: 19
End: 2024-04-02

## 2024-04-02 VITALS
HEART RATE: 82 BPM | WEIGHT: 142.2 LBS | SYSTOLIC BLOOD PRESSURE: 105 MMHG | DIASTOLIC BLOOD PRESSURE: 61 MMHG | BODY MASS INDEX: 24.41 KG/M2

## 2024-04-02 DIAGNOSIS — Z72.0 VAPES NICOTINE CONTAINING SUBSTANCE: ICD-10-CM

## 2024-04-02 DIAGNOSIS — D50.8 IRON DEFICIENCY ANEMIA SECONDARY TO INADEQUATE DIETARY IRON INTAKE: ICD-10-CM

## 2024-04-02 DIAGNOSIS — Z3A.24 24 WEEKS GESTATION OF PREGNANCY: ICD-10-CM

## 2024-04-02 DIAGNOSIS — B96.89 BV (BACTERIAL VAGINOSIS): Primary | ICD-10-CM

## 2024-04-02 DIAGNOSIS — O35.BXX0 FETAL CARDIAC ECHOGENIC FOCUS, ANTEPARTUM, SINGLE OR UNSPECIFIED FETUS: ICD-10-CM

## 2024-04-02 DIAGNOSIS — R82.5 POSITIVE URINE DRUG SCREEN: ICD-10-CM

## 2024-04-02 DIAGNOSIS — F12.90 MARIJUANA USE DURING PREGNANCY: ICD-10-CM

## 2024-04-02 DIAGNOSIS — Z22.39 CARRIER OF UREAPLASMA UREALYTICUM: ICD-10-CM

## 2024-04-02 DIAGNOSIS — N76.0 BV (BACTERIAL VAGINOSIS): Primary | ICD-10-CM

## 2024-04-02 DIAGNOSIS — O99.320 MARIJUANA USE DURING PREGNANCY: ICD-10-CM

## 2024-04-02 DIAGNOSIS — O99.311 ALCOHOL CONSUMPTION DURING PREGNANCY IN FIRST TRIMESTER: Primary | ICD-10-CM

## 2024-04-02 DIAGNOSIS — R74.8 ELEVATED LIVER ENZYMES: ICD-10-CM

## 2024-04-02 DIAGNOSIS — D58.2 HEMOGLOBIN C TRAIT (HCC): ICD-10-CM

## 2024-04-02 LAB
GLUCOSE URINE, POC: NEGATIVE
HAV IGM SERPL QL IA: NONREACTIVE
HBV CORE IGM SERPL QL IA: NONREACTIVE
HBV SURFACE AG SERPL QL IA: NONREACTIVE
HCV AB SERPL QL IA: NONREACTIVE
MICROORGANISM SPEC CULT: ABNORMAL
PROTEIN UA: NEGATIVE
SPECIMEN DESCRIPTION: ABNORMAL

## 2024-04-02 PROCEDURE — 1036F TOBACCO NON-USER: CPT | Performed by: OBSTETRICS & GYNECOLOGY

## 2024-04-02 PROCEDURE — 81002 URINALYSIS NONAUTO W/O SCOPE: CPT | Performed by: OBSTETRICS & GYNECOLOGY

## 2024-04-02 PROCEDURE — 76805 OB US >/= 14 WKS SNGL FETUS: CPT | Performed by: OBSTETRICS & GYNECOLOGY

## 2024-04-02 PROCEDURE — 99213 OFFICE O/P EST LOW 20 MIN: CPT | Performed by: OBSTETRICS & GYNECOLOGY

## 2024-04-02 PROCEDURE — G8427 DOCREV CUR MEDS BY ELIG CLIN: HCPCS | Performed by: OBSTETRICS & GYNECOLOGY

## 2024-04-02 PROCEDURE — 99214 OFFICE O/P EST MOD 30 MIN: CPT | Performed by: OBSTETRICS & GYNECOLOGY

## 2024-04-02 PROCEDURE — G8420 CALC BMI NORM PARAMETERS: HCPCS | Performed by: OBSTETRICS & GYNECOLOGY

## 2024-04-02 RX ORDER — AZITHROMYCIN 500 MG/1
1000 TABLET, FILM COATED ORAL ONCE
Qty: 2 TABLET | Refills: 0 | Status: SHIPPED | OUTPATIENT
Start: 2024-04-02 | End: 2024-04-02

## 2024-04-02 RX ORDER — CLINDAMYCIN HYDROCHLORIDE 300 MG/1
300 CAPSULE ORAL 3 TIMES DAILY
Qty: 21 CAPSULE | Refills: 0 | Status: SHIPPED | OUTPATIENT
Start: 2024-04-02 | End: 2024-04-09

## 2024-04-02 NOTE — TELEPHONE ENCOUNTER
Health track positive for BV, Mycoplasma, and Ureaplasma. Clindamycin and Azithro sent to pharmacy. Patient informed via phone.

## 2024-04-02 NOTE — PROGRESS NOTES
Keagan Rivera presents to office today for ultrasound.  Patient denies bleeding, LOF, or cramping.   Positive fetal movement.

## 2024-04-02 NOTE — PROGRESS NOTES
24    Cindy Manzano MD  [address] [facility]  [address] [place] [id number]     RE:  KEAGAN SHEIKH  : 2005   AGE: 18 y.o.    This report has been created using voice recognition software. It may contain errors which are inherent in voice recognition technology.    Dear Dr. Manzano:    Keagan Sheikh had an appointment today for the following indications:    Patient Active Problem List   Diagnosis    Marijuana use during pregnancy    Alcohol consumption during pregnancy in first trimester    Vapes nicotine containing substance    Fetal cardiac echogenic focus, antepartum    Hemoglobin C trait (HCC)    Iron deficiency anemia, unspecified    Elevated liver enzymes    Positive urine drug screen     Keagan Sheikh is a 18 y.o. female, who is G1(0). She has an Estimated Date of Delivery: 2024 based on her established dates.  She is currently 24 weeks 4 days gestation based on that assessment.    The patient states that her fetal movement has been good.  She has no vaginal bleeding or leaking of amniotic fluid.    On 3/1/2024 the liver enzymes were elevated, with her ALT 84 U/L (0-32) and her AST 70 U/L (0-31).  Her alkaline phosphatase was 59 units/L which is within normal limits for pregnancy.  Her platelet count was normal.  Her serum ferritin was decreased at 11 ng/mL.  Her serum iron was 34 mcg/dL with decreased saturation at 8%.  Her liver enzymes on 2024 were within normal limits.  Her hepatitis panel was negative.    The results of [id number] screen were negative. I advised her that this a screening test not a diagnostic test. I advised her that the test screens only for trisomy 21, 18 and 13. We discussed the sensitivity of the test which I advised the patient is as follows:      99.1% for detection of trisomy 21 with a specificity of 99.9%   >99.9% for trisomy 18 with a specificity of 99.6%     91.7% for trisomy 13 with a specificity of 99.7%     The patient understands that she may be a

## 2024-04-03 ENCOUNTER — HOSPITAL ENCOUNTER (OUTPATIENT)
Dept: INFUSION THERAPY | Age: 19
Setting detail: INFUSION SERIES
Discharge: HOME | End: 2024-04-03
Payer: COMMERCIAL

## 2024-04-03 VITALS
WEIGHT: 142 LBS | OXYGEN SATURATION: 100 % | TEMPERATURE: 97.8 F | BODY MASS INDEX: 24.24 KG/M2 | HEART RATE: 97 BPM | SYSTOLIC BLOOD PRESSURE: 109 MMHG | HEIGHT: 64 IN | RESPIRATION RATE: 16 BRPM | DIASTOLIC BLOOD PRESSURE: 64 MMHG

## 2024-04-03 DIAGNOSIS — D50.9 IRON DEFICIENCY ANEMIA, UNSPECIFIED IRON DEFICIENCY ANEMIA TYPE: Primary | ICD-10-CM

## 2024-04-03 PROCEDURE — 6360000002 HC RX W HCPCS: Performed by: OBSTETRICS & GYNECOLOGY

## 2024-04-03 PROCEDURE — 96374 THER/PROPH/DIAG INJ IV PUSH: CPT

## 2024-04-03 PROCEDURE — 2580000003 HC RX 258: Performed by: OBSTETRICS & GYNECOLOGY

## 2024-04-03 RX ORDER — DIPHENHYDRAMINE HYDROCHLORIDE 50 MG/ML
50 INJECTION INTRAMUSCULAR; INTRAVENOUS
OUTPATIENT
Start: 2024-04-10

## 2024-04-03 RX ORDER — SODIUM CHLORIDE 9 MG/ML
5-250 INJECTION, SOLUTION INTRAVENOUS PRN
OUTPATIENT
Start: 2024-04-10

## 2024-04-03 RX ORDER — SODIUM CHLORIDE 9 MG/ML
5-250 INJECTION, SOLUTION INTRAVENOUS PRN
Status: CANCELLED | OUTPATIENT
Start: 2024-04-03

## 2024-04-03 RX ORDER — SODIUM CHLORIDE 0.9 % (FLUSH) 0.9 %
5-40 SYRINGE (ML) INJECTION PRN
Status: DISCONTINUED | OUTPATIENT
Start: 2024-04-03 | End: 2024-04-04 | Stop reason: HOSPADM

## 2024-04-03 RX ORDER — ALBUTEROL SULFATE 90 UG/1
4 AEROSOL, METERED RESPIRATORY (INHALATION) PRN
OUTPATIENT
Start: 2024-04-10

## 2024-04-03 RX ORDER — SODIUM CHLORIDE 0.9 % (FLUSH) 0.9 %
5-40 SYRINGE (ML) INJECTION PRN
OUTPATIENT
Start: 2024-04-10

## 2024-04-03 RX ORDER — SODIUM CHLORIDE 9 MG/ML
5-250 INJECTION, SOLUTION INTRAVENOUS PRN
Status: DISCONTINUED | OUTPATIENT
Start: 2024-04-03 | End: 2024-04-04 | Stop reason: HOSPADM

## 2024-04-03 RX ORDER — EPINEPHRINE 1 MG/ML
0.3 INJECTION, SOLUTION, CONCENTRATE INTRAVENOUS PRN
OUTPATIENT
Start: 2024-04-10

## 2024-04-03 RX ORDER — ACETAMINOPHEN 325 MG/1
650 TABLET ORAL
OUTPATIENT
Start: 2024-04-10

## 2024-04-03 RX ORDER — SODIUM CHLORIDE 9 MG/ML
INJECTION, SOLUTION INTRAVENOUS CONTINUOUS
OUTPATIENT
Start: 2024-04-10

## 2024-04-03 RX ORDER — ONDANSETRON 2 MG/ML
8 INJECTION INTRAMUSCULAR; INTRAVENOUS
OUTPATIENT
Start: 2024-04-10

## 2024-04-03 RX ADMIN — SODIUM CHLORIDE 20 ML/HR: 9 INJECTION, SOLUTION INTRAVENOUS at 13:56

## 2024-04-03 RX ADMIN — IRON SUCROSE 200 MG: 20 INJECTION, SOLUTION INTRAVENOUS at 13:56

## 2024-04-03 RX ADMIN — SODIUM CHLORIDE, PRESERVATIVE FREE 10 ML: 5 INJECTION INTRAVENOUS at 13:45

## 2024-04-06 LAB — VIT B1 PYROPHOSHATE BLD-SCNC: 136 NMOL/L (ref 70–180)

## 2024-04-10 ENCOUNTER — HOSPITAL ENCOUNTER (OUTPATIENT)
Dept: INFUSION THERAPY | Age: 19
Setting detail: INFUSION SERIES
Discharge: HOME | End: 2024-04-10
Payer: COMMERCIAL

## 2024-04-10 VITALS
WEIGHT: 142 LBS | SYSTOLIC BLOOD PRESSURE: 112 MMHG | RESPIRATION RATE: 16 BRPM | BODY MASS INDEX: 24.36 KG/M2 | HEART RATE: 91 BPM | TEMPERATURE: 97.8 F | OXYGEN SATURATION: 100 % | DIASTOLIC BLOOD PRESSURE: 61 MMHG

## 2024-04-10 DIAGNOSIS — D50.9 IRON DEFICIENCY ANEMIA, UNSPECIFIED IRON DEFICIENCY ANEMIA TYPE: Primary | ICD-10-CM

## 2024-04-10 PROCEDURE — 2580000003 HC RX 258: Performed by: OBSTETRICS & GYNECOLOGY

## 2024-04-10 PROCEDURE — 96374 THER/PROPH/DIAG INJ IV PUSH: CPT

## 2024-04-10 PROCEDURE — 6360000002 HC RX W HCPCS: Performed by: OBSTETRICS & GYNECOLOGY

## 2024-04-10 RX ORDER — SODIUM CHLORIDE 0.9 % (FLUSH) 0.9 %
5-40 SYRINGE (ML) INJECTION PRN
Status: DISCONTINUED | OUTPATIENT
Start: 2024-04-10 | End: 2024-04-11 | Stop reason: HOSPADM

## 2024-04-10 RX ORDER — ONDANSETRON 2 MG/ML
8 INJECTION INTRAMUSCULAR; INTRAVENOUS
Status: CANCELLED | OUTPATIENT
Start: 2024-04-17

## 2024-04-10 RX ORDER — EPINEPHRINE 1 MG/ML
0.3 INJECTION, SOLUTION, CONCENTRATE INTRAVENOUS PRN
Status: CANCELLED | OUTPATIENT
Start: 2024-04-17

## 2024-04-10 RX ORDER — SODIUM CHLORIDE 9 MG/ML
5-250 INJECTION, SOLUTION INTRAVENOUS PRN
Status: CANCELLED | OUTPATIENT
Start: 2024-04-17

## 2024-04-10 RX ORDER — ALBUTEROL SULFATE 90 UG/1
4 AEROSOL, METERED RESPIRATORY (INHALATION) PRN
Status: CANCELLED | OUTPATIENT
Start: 2024-04-17

## 2024-04-10 RX ORDER — ACETAMINOPHEN 325 MG/1
650 TABLET ORAL
Status: CANCELLED | OUTPATIENT
Start: 2024-04-17

## 2024-04-10 RX ORDER — SODIUM CHLORIDE 9 MG/ML
INJECTION, SOLUTION INTRAVENOUS CONTINUOUS
Status: CANCELLED | OUTPATIENT
Start: 2024-04-17

## 2024-04-10 RX ORDER — DIPHENHYDRAMINE HYDROCHLORIDE 50 MG/ML
50 INJECTION INTRAMUSCULAR; INTRAVENOUS
Status: CANCELLED | OUTPATIENT
Start: 2024-04-17

## 2024-04-10 RX ORDER — SODIUM CHLORIDE 0.9 % (FLUSH) 0.9 %
5-40 SYRINGE (ML) INJECTION PRN
Status: CANCELLED | OUTPATIENT
Start: 2024-04-17

## 2024-04-10 RX ADMIN — SODIUM CHLORIDE, PRESERVATIVE FREE 10 ML: 5 INJECTION INTRAVENOUS at 14:11

## 2024-04-10 RX ADMIN — IRON SUCROSE 200 MG: 20 INJECTION, SOLUTION INTRAVENOUS at 14:20

## 2024-04-17 ENCOUNTER — HOSPITAL ENCOUNTER (OUTPATIENT)
Dept: INFUSION THERAPY | Age: 19
Setting detail: INFUSION SERIES
Discharge: HOME | End: 2024-04-17
Payer: COMMERCIAL

## 2024-04-17 VITALS
SYSTOLIC BLOOD PRESSURE: 108 MMHG | HEART RATE: 93 BPM | OXYGEN SATURATION: 100 % | WEIGHT: 142 LBS | BODY MASS INDEX: 24.24 KG/M2 | HEIGHT: 64 IN | DIASTOLIC BLOOD PRESSURE: 67 MMHG | RESPIRATION RATE: 18 BRPM | TEMPERATURE: 97.8 F

## 2024-04-17 DIAGNOSIS — D50.9 IRON DEFICIENCY ANEMIA, UNSPECIFIED IRON DEFICIENCY ANEMIA TYPE: Primary | ICD-10-CM

## 2024-04-17 PROCEDURE — 2580000003 HC RX 258: Performed by: OBSTETRICS & GYNECOLOGY

## 2024-04-17 PROCEDURE — 6360000002 HC RX W HCPCS: Performed by: OBSTETRICS & GYNECOLOGY

## 2024-04-17 PROCEDURE — 96374 THER/PROPH/DIAG INJ IV PUSH: CPT

## 2024-04-17 RX ORDER — SODIUM CHLORIDE 0.9 % (FLUSH) 0.9 %
5-40 SYRINGE (ML) INJECTION PRN
Status: DISCONTINUED | OUTPATIENT
Start: 2024-04-17 | End: 2024-04-17 | Stop reason: ALTCHOICE

## 2024-04-17 RX ORDER — SODIUM CHLORIDE 0.9 % (FLUSH) 0.9 %
5-40 SYRINGE (ML) INJECTION PRN
Status: CANCELLED | OUTPATIENT
Start: 2024-04-17

## 2024-04-17 RX ORDER — ALBUTEROL SULFATE 90 UG/1
4 AEROSOL, METERED RESPIRATORY (INHALATION) PRN
Status: CANCELLED | OUTPATIENT
Start: 2024-04-17

## 2024-04-17 RX ORDER — ONDANSETRON 2 MG/ML
8 INJECTION INTRAMUSCULAR; INTRAVENOUS
Status: CANCELLED | OUTPATIENT
Start: 2024-04-17

## 2024-04-17 RX ORDER — EPINEPHRINE 1 MG/ML
0.3 INJECTION, SOLUTION, CONCENTRATE INTRAVENOUS PRN
Status: CANCELLED | OUTPATIENT
Start: 2024-04-17

## 2024-04-17 RX ORDER — DIPHENHYDRAMINE HYDROCHLORIDE 50 MG/ML
50 INJECTION INTRAMUSCULAR; INTRAVENOUS
Status: CANCELLED | OUTPATIENT
Start: 2024-04-17

## 2024-04-17 RX ORDER — ACETAMINOPHEN 325 MG/1
650 TABLET ORAL
Status: CANCELLED | OUTPATIENT
Start: 2024-04-17

## 2024-04-17 RX ORDER — SODIUM CHLORIDE 9 MG/ML
INJECTION, SOLUTION INTRAVENOUS CONTINUOUS
Status: CANCELLED | OUTPATIENT
Start: 2024-04-17

## 2024-04-17 RX ORDER — SODIUM CHLORIDE 9 MG/ML
5-250 INJECTION, SOLUTION INTRAVENOUS PRN
Status: CANCELLED | OUTPATIENT
Start: 2024-04-17

## 2024-04-17 RX ORDER — SODIUM CHLORIDE 9 MG/ML
5-250 INJECTION, SOLUTION INTRAVENOUS PRN
Status: DISCONTINUED | OUTPATIENT
Start: 2024-04-17 | End: 2024-04-17 | Stop reason: ALTCHOICE

## 2024-04-17 RX ADMIN — SODIUM CHLORIDE 20 ML/HR: 9 INJECTION, SOLUTION INTRAVENOUS at 14:20

## 2024-04-17 RX ADMIN — IRON SUCROSE 200 MG: 20 INJECTION, SOLUTION INTRAVENOUS at 14:20

## 2024-04-17 RX ADMIN — SODIUM CHLORIDE, PRESERVATIVE FREE 10 ML: 5 INJECTION INTRAVENOUS at 14:19

## 2024-04-17 NOTE — PROGRESS NOTES
Tolerated infusion well.  Reviewed therapy plan, offered education material and/or discharge material, reviewed medication information and signs and symptoms  and educated on possible side effects, verbalizes good knowledge of current plan patient verbalizes understanding, and has no signs or symptoms to report at this time.   Patient discharged. Patient alert and oriented x3.   No distress noted.   Vital signs stable.   Patient denies any new or worsening pain.  Patient denies any needs.  All questions answered.  Declines copy of AVS. Patient stayed for 30 minute observation after completion of infusion.

## 2024-04-26 ENCOUNTER — HOSPITAL ENCOUNTER (OUTPATIENT)
Age: 19
Discharge: HOME | End: 2024-04-26

## 2024-04-26 ENCOUNTER — ROUTINE PRENATAL (OUTPATIENT)
Dept: OBGYN | Age: 19
End: 2024-04-26

## 2024-04-26 VITALS
DIASTOLIC BLOOD PRESSURE: 54 MMHG | BODY MASS INDEX: 24.59 KG/M2 | WEIGHT: 143.3 LBS | HEART RATE: 102 BPM | SYSTOLIC BLOOD PRESSURE: 118 MMHG

## 2024-04-26 DIAGNOSIS — B96.89 BV (BACTERIAL VAGINOSIS): ICD-10-CM

## 2024-04-26 DIAGNOSIS — Z3A.28 28 WEEKS GESTATION OF PREGNANCY: ICD-10-CM

## 2024-04-26 DIAGNOSIS — N76.0 BV (BACTERIAL VAGINOSIS): ICD-10-CM

## 2024-04-26 DIAGNOSIS — O09.93 HIGH-RISK PREGNANCY, THIRD TRIMESTER: Primary | ICD-10-CM

## 2024-04-26 LAB
GLUCOSE URINE, POC: NEGATIVE
PROTEIN UA: NEGATIVE

## 2024-04-26 RX ORDER — METRONIDAZOLE 7.5 MG/G
1 GEL VAGINAL DAILY
Qty: 70 G | Refills: 0 | Status: SHIPPED | OUTPATIENT
Start: 2024-04-26 | End: 2024-05-01

## 2024-04-26 NOTE — PROGRESS NOTES
Keagan Rivera is a  female 28w0d    PAST OB HISTORY  OB History    Para Term  AB Living   1 0 0 0 0 0   SAB IAB Ectopic Molar Multiple Live Births   0 0 0 0 0 0      # Outcome Date GA Lbr Italo/2nd Weight Sex Delivery Anes PTL Lv   1 Current                PAST MEDICAL HISTORY  Past Medical History:   Diagnosis Date    Migraine       There was regular fetal movements. No contractions or leakage of fluid.   She currently denies any bleeding, cramping, or contractions. No complaints of abdominal pain.   She did not complete antibiotics for vaginal discharge.   She would like a letter written for work restrictions.     The patient was seen and evaluated.     Physical Exam:  Alert, cooperative, oriented to time and place  Abdomen soft non-tender  Skin warm and dry, no peripheral edema noted.   See flow sheet  Mother's Prenatal Vitals  BP: (!) 118/54  Weight - Scale: 65 kg (143 lb 4.8 oz)  Pulse: (!) 102  Patient Position: Sitting  Alb/Glu  Albumin: Negative  Glucose: Negative  Prenatal Fetal Information  Fetal HR: 149  Movement: Present    Genetic counseling and testing done and reviewed    Glucola screening was not done yet completed:  Results reviewed.  Glucose testing not done yet.     The patients fetal anatomy ultrasound was completed and reviewed.      Assessment:  IUP 28w0d weeks  Size  = to dates  High Risk Teen Pregnancy   Substance use in pregnancy- hx of binge drinking and alcohol use in 1st trimester, THC  Tobacco use  Hemoglobin C trait  Anemia- iron infusions scheduled. Hb 12.1 on   Transaminitis- LFTs normal range 24.  Liver US ordered per [facility]  JANE, PACs- fetal echocardiogram scheduled   Vaginal discharge     Plan:  The patient will return to [facility] in Pregnancy for her next visit in 2 weeks.  Obtain a Maternal Fetal Medicine consult or follow-up for Other Transamnitis, Anemia, JANE, scheduled 24  If needed, obtain a [place] Women[facility] Consult for Not needed at this

## 2024-05-16 ENCOUNTER — ROUTINE PRENATAL (OUTPATIENT)
Dept: OBGYN CLINIC | Age: 19
End: 2024-05-16
Payer: COMMERCIAL

## 2024-05-16 ENCOUNTER — ANCILLARY PROCEDURE (OUTPATIENT)
Dept: OBGYN CLINIC | Age: 19
End: 2024-05-16
Payer: COMMERCIAL

## 2024-05-16 VITALS
HEART RATE: 100 BPM | WEIGHT: 145 LBS | SYSTOLIC BLOOD PRESSURE: 108 MMHG | DIASTOLIC BLOOD PRESSURE: 73 MMHG | BODY MASS INDEX: 24.88 KG/M2

## 2024-05-16 DIAGNOSIS — R82.5 POSITIVE URINE DRUG SCREEN: ICD-10-CM

## 2024-05-16 DIAGNOSIS — O99.311 ALCOHOL CONSUMPTION DURING PREGNANCY IN FIRST TRIMESTER: ICD-10-CM

## 2024-05-16 DIAGNOSIS — D50.8 IRON DEFICIENCY ANEMIA SECONDARY TO INADEQUATE DIETARY IRON INTAKE: ICD-10-CM

## 2024-05-16 DIAGNOSIS — Z72.0 VAPES NICOTINE CONTAINING SUBSTANCE: ICD-10-CM

## 2024-05-16 DIAGNOSIS — Z3A.30 30 WEEKS GESTATION OF PREGNANCY: ICD-10-CM

## 2024-05-16 DIAGNOSIS — O99.320 MARIJUANA USE DURING PREGNANCY: ICD-10-CM

## 2024-05-16 DIAGNOSIS — F12.90 MARIJUANA USE DURING PREGNANCY: ICD-10-CM

## 2024-05-16 DIAGNOSIS — R74.8 ELEVATED LIVER ENZYMES: ICD-10-CM

## 2024-05-16 DIAGNOSIS — O35.BXX0 FETAL CARDIAC ECHOGENIC FOCUS, ANTEPARTUM, SINGLE OR UNSPECIFIED FETUS: ICD-10-CM

## 2024-05-16 DIAGNOSIS — D58.2 HEMOGLOBIN C TRAIT (HCC): ICD-10-CM

## 2024-05-16 LAB
GLUCOSE URINE, POC: NORMAL
PROTEIN UA: NEGATIVE

## 2024-05-16 PROCEDURE — 81002 URINALYSIS NONAUTO W/O SCOPE: CPT | Performed by: OBSTETRICS & GYNECOLOGY

## 2024-05-16 PROCEDURE — 99214 OFFICE O/P EST MOD 30 MIN: CPT | Performed by: OBSTETRICS & GYNECOLOGY

## 2024-05-16 PROCEDURE — 76816 OB US FOLLOW-UP PER FETUS: CPT | Performed by: OBSTETRICS & GYNECOLOGY

## 2024-05-16 PROCEDURE — 1036F TOBACCO NON-USER: CPT | Performed by: OBSTETRICS & GYNECOLOGY

## 2024-05-16 PROCEDURE — G8427 DOCREV CUR MEDS BY ELIG CLIN: HCPCS | Performed by: OBSTETRICS & GYNECOLOGY

## 2024-05-16 PROCEDURE — 76818 FETAL BIOPHYS PROFILE W/NST: CPT | Performed by: OBSTETRICS & GYNECOLOGY

## 2024-05-16 PROCEDURE — 99213 OFFICE O/P EST LOW 20 MIN: CPT | Performed by: OBSTETRICS & GYNECOLOGY

## 2024-05-16 PROCEDURE — G8420 CALC BMI NORM PARAMETERS: HCPCS | Performed by: OBSTETRICS & GYNECOLOGY

## 2024-05-16 NOTE — PROGRESS NOTES
Patient is here today for f/u. Denies any vaginal bleeding, cramping, or leakage of fluids.  Patient reports good fetal movement.     
care.    I spent a total of 32 minutes with> 51% of the total time involved with completing the encounter. The total time included the following:    Independently interviewing the patient (HPI, ROS, PMH, PSH, FMH, SH, allergies, and medications)  Independently performing a medically appropriate examination  Reviewing the above documentation  Ordering medications, tests, and/or procedures  Formulating the assessment/plan and reviewing the rationale for the above recommendations  Reviewing available records, results of all previously ordered testing/procedures, and current problem list  Counseling/educating the patient  Coordinating care with other healthcare professionals  Communicating results to the patient's family/caregiver  Documenting clinical information in the patient's electronic health record     Available paper and electronic medical records were reviewed.  I reviewed with the patient the results of the fetal ultrasound evaluation performed today including the limitations of the testing.  I reviewed with the patient the results of her fetal echocardiogram.  I previously reviewed with the patient the results of her NIPT including the limitations of the testing.  I reviewed with the patient the difference between screening and diagnostic genetic testing.  I advised the patient that NIPT is a screening genetic test for fetal aneuploidy for the chromosomes evaluated including chromosomes 13, 18, 21, X, and Y.  I advised the patient that screening genetic testing is used to assess fetal risk for aneuploidy for the chromosomes evaluated.  Diagnostic genetic testing would be necessary to confirm the fetal karyotype being normal or abnormal.  Screening genetic testing does not replace diagnostic genetic testing.  I reviewed with the patient the laboratory testing performed including the liver enzymes and decreased serum ferritin. I discussed with the patient her increased risk for having a fetus with fetal alcohol

## 2024-05-30 ENCOUNTER — ROUTINE PRENATAL (OUTPATIENT)
Dept: OBGYN CLINIC | Age: 19
End: 2024-05-30
Payer: COMMERCIAL

## 2024-05-30 ENCOUNTER — ANCILLARY PROCEDURE (OUTPATIENT)
Dept: OBGYN CLINIC | Age: 19
End: 2024-05-30
Payer: COMMERCIAL

## 2024-05-30 VITALS
DIASTOLIC BLOOD PRESSURE: 67 MMHG | SYSTOLIC BLOOD PRESSURE: 101 MMHG | BODY MASS INDEX: 25.01 KG/M2 | WEIGHT: 145.8 LBS | HEART RATE: 80 BPM

## 2024-05-30 DIAGNOSIS — R74.8 ELEVATED LIVER ENZYMES: ICD-10-CM

## 2024-05-30 DIAGNOSIS — D58.2 HEMOGLOBIN C TRAIT (HCC): ICD-10-CM

## 2024-05-30 DIAGNOSIS — D50.8 IRON DEFICIENCY ANEMIA SECONDARY TO INADEQUATE DIETARY IRON INTAKE: ICD-10-CM

## 2024-05-30 DIAGNOSIS — Z72.0 VAPES NICOTINE CONTAINING SUBSTANCE: ICD-10-CM

## 2024-05-30 DIAGNOSIS — O99.320 MARIJUANA USE DURING PREGNANCY: ICD-10-CM

## 2024-05-30 DIAGNOSIS — R82.5 POSITIVE URINE DRUG SCREEN: ICD-10-CM

## 2024-05-30 DIAGNOSIS — O99.311 ALCOHOL CONSUMPTION DURING PREGNANCY IN FIRST TRIMESTER: ICD-10-CM

## 2024-05-30 DIAGNOSIS — O35.BXX0 FETAL CARDIAC ECHOGENIC FOCUS, ANTEPARTUM, SINGLE OR UNSPECIFIED FETUS: ICD-10-CM

## 2024-05-30 DIAGNOSIS — F12.90 MARIJUANA USE DURING PREGNANCY: ICD-10-CM

## 2024-05-30 DIAGNOSIS — Z3A.32 32 WEEKS GESTATION OF PREGNANCY: ICD-10-CM

## 2024-05-30 LAB
GLUCOSE URINE, POC: NEGATIVE
PROTEIN UA: NEGATIVE

## 2024-05-30 PROCEDURE — 81002 URINALYSIS NONAUTO W/O SCOPE: CPT | Performed by: OBSTETRICS & GYNECOLOGY

## 2024-05-30 PROCEDURE — 99999 PR OFFICE/OUTPT VISIT,PROCEDURE ONLY: CPT | Performed by: OBSTETRICS & GYNECOLOGY

## 2024-05-30 PROCEDURE — 76818 FETAL BIOPHYS PROFILE W/NST: CPT | Performed by: OBSTETRICS & GYNECOLOGY

## 2024-05-30 PROCEDURE — 99213 OFFICE O/P EST LOW 20 MIN: CPT | Performed by: OBSTETRICS & GYNECOLOGY

## 2024-05-30 NOTE — PROGRESS NOTES
Keagan Rivera presents to office today for US/NST.   Patient denies bleeding, LOF, or cramping.   Positive fetal movement.     
Delivery: 2024    2.  MaterniT 21 screen showed no increased risk for fetal aneuploidy for the chromosomes assessed.    3.  Nicotine vaper user    4.  Alcohol use in pregnancy     5.  Marijuana use in pregnancy     6.  Hemoglobin C trait.  31.9% HbC identified on electrophoresis    7.  Father to the baby stated he does not have a hemoglobinopathy    8.  Hypochromic, microcytic anemia    9.  Low serum ferritin 3/1/2024, scheduled for outpatient iron infusion therapy  10.  Elevated liver enzymes 3/1/2024  11.  Normal cervical length without funneling of the amniotic membranes 3/6/2024  12.  Normal interval fetal growth 2024  13.  Fetal echocardiogram demonstrated a aneurysmal atrial septum.  14.  Reactive nonstress test 2024  15.  Reassuring biophysical profile 2024    PLAN:  I would recommend that the patient count fetal movements and call if she notices any subjective decrease in fetal movements, particularly if there are less than 10 major movements in 2 hours. Non-stress testing should be performed every 3 to 4 days through the balance of the pregnancy. Serial ultrasounds to assess fetal anatomy and growth should be performed. The patient is at increase risk for  morbidity and mortality secondary to her history. Weekly BPP and cord Doppler testing should be performed, unless there is a clinical indication to perform the testing more frequently.    The patient was advised to call if she has any increased vaginal discharge, vaginal bleeding, contractions, abdominal pain, back pain or any new significant symptomatology prior to her next visit. I advised her that these are signs and symptoms of cervical change and require follow-up assessment when they occur.    I requested the patient return for a follow-up assessment in 1 week unless there is a clinical reason for her to return prior to that time. She is to call if she has any problems or questions prior to her next visit.     Further

## 2024-06-05 ENCOUNTER — ROUTINE PRENATAL (OUTPATIENT)
Dept: OBGYN CLINIC | Age: 19
End: 2024-06-05
Payer: COMMERCIAL

## 2024-06-05 ENCOUNTER — ANCILLARY PROCEDURE (OUTPATIENT)
Dept: OBGYN CLINIC | Age: 19
End: 2024-06-05
Payer: COMMERCIAL

## 2024-06-05 VITALS
BODY MASS INDEX: 25.73 KG/M2 | DIASTOLIC BLOOD PRESSURE: 72 MMHG | HEART RATE: 100 BPM | SYSTOLIC BLOOD PRESSURE: 104 MMHG | WEIGHT: 150 LBS

## 2024-06-05 DIAGNOSIS — R74.8 ELEVATED LIVER ENZYMES: ICD-10-CM

## 2024-06-05 DIAGNOSIS — O09.93 HIGH-RISK PREGNANCY IN THIRD TRIMESTER: Primary | ICD-10-CM

## 2024-06-05 DIAGNOSIS — R82.5 POSITIVE URINE DRUG SCREEN: ICD-10-CM

## 2024-06-05 DIAGNOSIS — Z72.0 VAPES NICOTINE CONTAINING SUBSTANCE: ICD-10-CM

## 2024-06-05 DIAGNOSIS — F12.90 MARIJUANA USE DURING PREGNANCY: ICD-10-CM

## 2024-06-05 DIAGNOSIS — D50.8 IRON DEFICIENCY ANEMIA SECONDARY TO INADEQUATE DIETARY IRON INTAKE: ICD-10-CM

## 2024-06-05 DIAGNOSIS — O99.311 ALCOHOL CONSUMPTION DURING PREGNANCY IN FIRST TRIMESTER: ICD-10-CM

## 2024-06-05 DIAGNOSIS — Z3A.33 33 WEEKS GESTATION OF PREGNANCY: ICD-10-CM

## 2024-06-05 DIAGNOSIS — D58.2 HEMOGLOBIN C TRAIT (HCC): ICD-10-CM

## 2024-06-05 DIAGNOSIS — O99.320 MARIJUANA USE DURING PREGNANCY: ICD-10-CM

## 2024-06-05 LAB
GLUCOSE URINE, POC: NEGATIVE
PROTEIN UA: NEGATIVE

## 2024-06-05 PROCEDURE — 99213 OFFICE O/P EST LOW 20 MIN: CPT | Performed by: OBSTETRICS & GYNECOLOGY

## 2024-06-05 PROCEDURE — 99999 PR OFFICE/OUTPT VISIT,PROCEDURE ONLY: CPT | Performed by: OBSTETRICS & GYNECOLOGY

## 2024-06-05 PROCEDURE — 76818 FETAL BIOPHYS PROFILE W/NST: CPT | Performed by: OBSTETRICS & GYNECOLOGY

## 2024-06-05 PROCEDURE — 81002 URINALYSIS NONAUTO W/O SCOPE: CPT | Performed by: OBSTETRICS & GYNECOLOGY

## 2024-06-05 PROCEDURE — 76816 OB US FOLLOW-UP PER FETUS: CPT | Performed by: OBSTETRICS & GYNECOLOGY

## 2024-06-05 NOTE — PROGRESS NOTES
24    Cindy Manzano MD  [address] [facility]  [address] [place] [id number]     RE:  KEAGAN SHEIKH  : 2005   AGE: 19 y.o.    This report has been created using voice recognition software. It may contain errors which are inherent in voice recognition technology.    Dear Dr. Manzano:    Keagan Sheikh had a fetal ultrasound evaluation and fetal testing performed today for the following indications:    Patient Active Problem List   Diagnosis    Marijuana use during pregnancy    Alcohol consumption during pregnancy in first trimester    Vapes nicotine containing substance    Fetal cardiac echogenic focus, antepartum    Hemoglobin C trait (HCC)    Iron deficiency anemia, unspecified    Elevated liver enzymes    Positive urine drug screen     Keagan Sheikh is a 19 y.o. female, who is G1(0). She has an Estimated Date of Delivery: 2024 based on her established dates.  She is currently 33 weeks 5 days gestation based on that assessment.    The patient states that her fetal movement has been good.  She has no vaginal bleeding or leaking of amniotic fluid.    On 3/1/2024 the liver enzymes were elevated, with her ALT 84 U/L (0-32) and her AST 70 U/L (0-31).  Her alkaline phosphatase was 59 units/L which is within normal limits for pregnancy.  Her platelet count was normal.  Her serum ferritin was decreased at 11 ng/mL.  Her serum iron was 34 mcg/dL with decreased saturation at 8%.  Her liver enzymes on 2024 were within normal limits.  Her hepatitis panel was negative.    The results of [id number] screen were negative. I advised her that this a screening test not a diagnostic test. I advised her that the test screens only for trisomy 21, 18 and 13. We discussed the sensitivity of the test which I advised the patient is as follows:      99.1% for detection of trisomy 21 with a specificity of 99.9%   >99.9% for trisomy 18 with a specificity of 99.6%     91.7% for trisomy 13 with a specificity of 99.7%     The

## 2024-06-05 NOTE — PROGRESS NOTES
Keagan Rivera presents to office today for NST/US.  Patient denies bleeding, LOF, or cramping.   Positive fetal movement.

## 2024-06-05 NOTE — PATIENT INSTRUCTIONS
Care instructions adapted under license by [facility]. If you have questions about a medical condition or this instruction, always ask your healthcare professional. [employer] disclaims any warranty or liability for your use of this information.

## 2024-06-10 ENCOUNTER — ROUTINE PRENATAL (OUTPATIENT)
Dept: OBGYN CLINIC | Age: 19
End: 2024-06-10
Payer: COMMERCIAL

## 2024-06-10 ENCOUNTER — ANCILLARY PROCEDURE (OUTPATIENT)
Dept: OBGYN CLINIC | Age: 19
End: 2024-06-10
Payer: COMMERCIAL

## 2024-06-10 VITALS
BODY MASS INDEX: 25.91 KG/M2 | DIASTOLIC BLOOD PRESSURE: 68 MMHG | SYSTOLIC BLOOD PRESSURE: 105 MMHG | WEIGHT: 151 LBS | HEART RATE: 100 BPM

## 2024-06-10 DIAGNOSIS — O35.BXX0 FETAL CARDIAC ECHOGENIC FOCUS, ANTEPARTUM, SINGLE OR UNSPECIFIED FETUS: ICD-10-CM

## 2024-06-10 DIAGNOSIS — O99.311 ALCOHOL CONSUMPTION DURING PREGNANCY IN FIRST TRIMESTER: ICD-10-CM

## 2024-06-10 DIAGNOSIS — Z3A.34 34 WEEKS GESTATION OF PREGNANCY: ICD-10-CM

## 2024-06-10 DIAGNOSIS — Z72.0 VAPES NICOTINE CONTAINING SUBSTANCE: ICD-10-CM

## 2024-06-10 DIAGNOSIS — O99.320 MARIJUANA USE DURING PREGNANCY: Primary | ICD-10-CM

## 2024-06-10 DIAGNOSIS — F12.90 MARIJUANA USE DURING PREGNANCY: Primary | ICD-10-CM

## 2024-06-10 DIAGNOSIS — R82.5 POSITIVE URINE DRUG SCREEN: ICD-10-CM

## 2024-06-10 LAB
GLUCOSE URINE, POC: NEGATIVE
PROTEIN UA: POSITIVE

## 2024-06-10 PROCEDURE — 76818 FETAL BIOPHYS PROFILE W/NST: CPT | Performed by: OBSTETRICS & GYNECOLOGY

## 2024-06-10 PROCEDURE — 99213 OFFICE O/P EST LOW 20 MIN: CPT | Performed by: OBSTETRICS & GYNECOLOGY

## 2024-06-10 PROCEDURE — 99999 PR OFFICE/OUTPT VISIT,PROCEDURE ONLY: CPT | Performed by: OBSTETRICS & GYNECOLOGY

## 2024-06-10 PROCEDURE — 81002 URINALYSIS NONAUTO W/O SCOPE: CPT | Performed by: OBSTETRICS & GYNECOLOGY

## 2024-06-11 NOTE — PROGRESS NOTES
Keagan Rivera presents to office today for US/NST.  Patient denies bleeding, LOF, or cramping.   Positive fetal movement.     
increased risk for developing leukemia in children.    The patient has a history of using nicotine vapor.  Nicotine vapor use is not recommended during pregnancy.    The patient's hemoglobin electrophoresis showed that she had 31.9% HbC. Hemoglobin C (Hb C) is one of the most common structural hemoglobin variants in the human population. People with hemoglobin C trait (Hb AC) are phenotypically normal, with no clinically evident limitations or symptoms. Those with hemoglobin C disease (Hb CC) may have a mild degree of hemolytic anemia, splenomegaly, and borderline anemia. Although the clinical complications of hemoglobin C disease are not severe, inheritance with other hemoglobinopathies such as hemoglobin S may have significant consequences, and genetic counseling and anticipatory guidance play an important role in providing care for these individuals.  The father to the baby was present at the time of the patient's evaluation.  He stated that he does not have a hemoglobinopathy.  If this is correct the fetus has a 50% chance of being a carrier for hemoglobin C.  The risk for fetal hemoglobinopathy is related to the presence of a hemoglobinopathy in both parents.    The NST today was reactive with moderate variability and accelerations.     A fetal ultrasound evaluation was performed on 2/7/2024.  A detailed report is included in the EMR under the imaging tab.  A living carpio intrauterine fetus was identified in the cephalic presentation, with normal fetal heart motion and normal fetal motion noted.  The placenta was on the anterior surface of the uterus.  A three-vessel umbilical cord was identified.  The amniotic fluid volume was within normal limits.  The biometric measurements were consistent with the patient's established dating parameters, within the limits of error of the test at the current gestational age.  A left echogenic intracardiac focus was identified.  Visualization of the fetal anatomy is

## 2024-06-13 ENCOUNTER — ROUTINE PRENATAL (OUTPATIENT)
Dept: OBGYN CLINIC | Age: 19
End: 2024-06-13
Payer: COMMERCIAL

## 2024-06-13 VITALS
DIASTOLIC BLOOD PRESSURE: 64 MMHG | HEART RATE: 103 BPM | SYSTOLIC BLOOD PRESSURE: 100 MMHG | WEIGHT: 149.1 LBS | BODY MASS INDEX: 25.58 KG/M2

## 2024-06-13 DIAGNOSIS — O99.311 ALCOHOL CONSUMPTION DURING PREGNANCY IN FIRST TRIMESTER: ICD-10-CM

## 2024-06-13 DIAGNOSIS — Z3A.34 34 WEEKS GESTATION OF PREGNANCY: ICD-10-CM

## 2024-06-13 DIAGNOSIS — Z72.0 VAPES NICOTINE CONTAINING SUBSTANCE: ICD-10-CM

## 2024-06-13 DIAGNOSIS — F12.90 MARIJUANA USE DURING PREGNANCY: ICD-10-CM

## 2024-06-13 DIAGNOSIS — D58.2 HEMOGLOBIN C TRAIT (HCC): ICD-10-CM

## 2024-06-13 DIAGNOSIS — O99.320 MARIJUANA USE DURING PREGNANCY: ICD-10-CM

## 2024-06-13 DIAGNOSIS — O35.BXX0 FETAL CARDIAC ECHOGENIC FOCUS, ANTEPARTUM, SINGLE OR UNSPECIFIED FETUS: ICD-10-CM

## 2024-06-13 DIAGNOSIS — R82.5 POSITIVE URINE DRUG SCREEN: Primary | ICD-10-CM

## 2024-06-13 DIAGNOSIS — D50.8 IRON DEFICIENCY ANEMIA SECONDARY TO INADEQUATE DIETARY IRON INTAKE: ICD-10-CM

## 2024-06-13 LAB
GLUCOSE URINE, POC: NEGATIVE
PROTEIN UA: POSITIVE

## 2024-06-13 PROCEDURE — 59025 FETAL NON-STRESS TEST: CPT | Performed by: OBSTETRICS & GYNECOLOGY

## 2024-06-13 PROCEDURE — 81002 URINALYSIS NONAUTO W/O SCOPE: CPT | Performed by: OBSTETRICS & GYNECOLOGY

## 2024-06-13 PROCEDURE — 99213 OFFICE O/P EST LOW 20 MIN: CPT | Performed by: OBSTETRICS & GYNECOLOGY

## 2024-06-13 NOTE — PROGRESS NOTES
Keagan Rivera presents to office today for USA/NST.  Patient denies bleeding, LOF, or cramping.   Positive fetal movement.

## 2024-06-13 NOTE — PROGRESS NOTES
24    Cindy Manzano MD  [address] [facility]  [address] [place] [id number]     RE:  KEAGAN SHEIKH  : 2005   AGE: 19 y.o.    This report has been created using voice recognition software. It may contain errors which are inherent in voice recognition technology.    Dear Dr. Manzano:    Keagan Sheikh had a nonstress test performed today for the following indications:    Patient Active Problem List   Diagnosis    Marijuana use during pregnancy    Alcohol consumption during pregnancy in first trimester    Vapes nicotine containing substance    Fetal cardiac echogenic focus, antepartum    Hemoglobin C trait (HCC)    Iron deficiency anemia, unspecified    Elevated liver enzymes    Positive urine drug screen     Keagan Sheikh is a 19 y.o. female, who is G1(0). She has an Estimated Date of Delivery: 2024 based on her established dates.  She is currently 34 weeks 6 days gestation based on that assessment.    The patient states that her fetal movement has been good.  She has no vaginal bleeding or leaking of amniotic fluid.    On 3/1/2024 the liver enzymes were elevated, with her ALT 84 U/L (0-32) and her AST 70 U/L (0-31).  Her alkaline phosphatase was 59 units/L which is within normal limits for pregnancy.  Her platelet count was normal.  Her serum ferritin was decreased at 11 ng/mL.  Her serum iron was 34 mcg/dL with decreased saturation at 8%.  Her liver enzymes on 2024 were within normal limits.  Her hepatitis panel was negative.    The results of [id number] screen were negative. I advised her that this a screening test not a diagnostic test. I advised her that the test screens only for trisomy 21, 18 and 13. We discussed the sensitivity of the test which I advised the patient is as follows:      99.1% for detection of trisomy 21 with a specificity of 99.9%   >99.9% for trisomy 18 with a specificity of 99.6%     91.7% for trisomy 13 with a specificity of 99.7%     The patient understands that she

## 2024-06-14 ENCOUNTER — ROUTINE PRENATAL (OUTPATIENT)
Dept: OBGYN | Age: 19
End: 2024-06-14

## 2024-06-14 VITALS
SYSTOLIC BLOOD PRESSURE: 95 MMHG | WEIGHT: 150.4 LBS | BODY MASS INDEX: 25.8 KG/M2 | DIASTOLIC BLOOD PRESSURE: 53 MMHG | HEART RATE: 101 BPM

## 2024-06-14 DIAGNOSIS — Z3A.35 35 WEEKS GESTATION OF PREGNANCY: ICD-10-CM

## 2024-06-14 DIAGNOSIS — O09.33 LIMITED PRENATAL CARE IN THIRD TRIMESTER: Primary | ICD-10-CM

## 2024-06-14 LAB
AMOUNT GLUCOSE GIVEN: NORMAL G
COLLECT TIME, 1HR GLUCOSE: 1016
DAT, POLYSPECIFIC: NEGATIVE
GLUCOSE TOLERANCE TEST 1 HOUR: 148 MG/DL
GLUCOSE URINE, POC: NEGATIVE
HCT VFR BLD CALC: 38.3 % (ref 34–48)
HEMOGLOBIN: 13.6 G/DL (ref 11.5–15.5)
MCH RBC QN AUTO: 31 PG (ref 26–35)
MCHC RBC AUTO-ENTMCNC: 35.5 G/DL (ref 32–34.5)
MCV RBC AUTO: 87.2 FL (ref 80–99.9)
PDW BLD-RTO: 14.8 % (ref 11.5–15)
PLATELET # BLD: 234 K/UL (ref 130–450)
PMV BLD AUTO: 9.6 FL (ref 7–12)
PROTEIN UA: NEGATIVE
RBC # BLD: 4.39 M/UL (ref 3.5–5.5)
WBC # BLD: 10.4 K/UL (ref 4.5–11.5)

## 2024-06-14 NOTE — PROGRESS NOTES
Keagan Rivera is a  female 35w0d    PAST OB HISTORY  OB History    Para Term  AB Living   1 0 0 0 0 0   SAB IAB Ectopic Molar Multiple Live Births   0 0 0 0 0 0      # Outcome Date GA Lbr Italo/2nd Weight Sex Delivery Anes PTL Lv   1 Current                PAST MEDICAL HISTORY  Past Medical History:   Diagnosis Date    Migraine       There was regular fetal movements. No contractions or leakage of fluid.   She currently denies any bleeding, cramping, or contractions. No complaints of abdominal pain.     The patient was seen and evaluated.     Physical Exam:  Alert, cooperative, oriented to time and place  Abdomen soft non-tender  Skin warm and dry, no peripheral edema noted.   See flow sheet  Mother's Prenatal Vitals  BP: (!) 95/53  Weight - Scale: 68.2 kg (150 lb 6.4 oz)  Pulse: (!) 101  Patient Position: Sitting  Alb/Glu  Albumin: Negative  Glucose: Negative  Prenatal Fetal Information  Fundal Height (cm): 35 cm  Fetal HR: 141  Movement: Present    Genetic counseling and testing done and reviewed    Glucola screening was not done yet completed:  Results reviewed.  Glucose testing not done yet.     The patients fetal anatomy ultrasound was completed and reviewed.      Assessment:  IUP 35w0d weeks  Size  = to dates  High Risk Teen Pregnancy   Limited prenatal care  Substance use in pregnancy- hx of binge drinking and alcohol use in 1st trimester, THC  Tobacco use  Hemoglobin C trait  Anemia- iron infusions scheduled. Hb 12.1 on   Transaminitis- LFTs normal range 24.  Liver US ordered per [facility]  JANE, PACs- fetal echocardiogram 24 w/ Dr. Andrade. A=Aneurysmal atrial septum;  echo recommended after 2 weeks of life as outpatient    Plan:  Glucola and third trimester labs drawn  GBS collected  PNL reviewed- needs Rubella drawn at follow up   Biweekly NST and BPP scheduled with [facility]  If needed, obtain a [place] Women's Consult for Not needed at this time.  Continue Medications as

## 2024-06-14 NOTE — PROGRESS NOTES
Patient alert and pleasant with no complaints.  Here today for prenatal visit.  Fetal heart tones obtained without difficulty.  Urine for glucose and protein obtained with negative results.  Pelvic exam completed, GBS specimen obtained, labeled and sent to the lab.  One hour GTT completed without difficulty.  Patient drank 50 gr. Glucose solution in under five minutes.  Patient sat for one hour.  Blood work was then obtained, labeled and sent to lab.     Discharge instructions have been discussed with the patient. Patient advised to call our office with any questions or concerns.   Voiced understanding.

## 2024-06-17 ENCOUNTER — ROUTINE PRENATAL (OUTPATIENT)
Dept: OBGYN CLINIC | Age: 19
End: 2024-06-17
Payer: COMMERCIAL

## 2024-06-17 ENCOUNTER — ANCILLARY PROCEDURE (OUTPATIENT)
Dept: OBGYN CLINIC | Age: 19
End: 2024-06-17
Payer: COMMERCIAL

## 2024-06-17 VITALS
SYSTOLIC BLOOD PRESSURE: 115 MMHG | BODY MASS INDEX: 25.87 KG/M2 | WEIGHT: 150.8 LBS | DIASTOLIC BLOOD PRESSURE: 71 MMHG | HEART RATE: 111 BPM

## 2024-06-17 DIAGNOSIS — O99.311 ALCOHOL CONSUMPTION DURING PREGNANCY IN FIRST TRIMESTER: Primary | ICD-10-CM

## 2024-06-17 DIAGNOSIS — D50.8 IRON DEFICIENCY ANEMIA SECONDARY TO INADEQUATE DIETARY IRON INTAKE: ICD-10-CM

## 2024-06-17 DIAGNOSIS — O35.BXX0 FETAL CARDIAC ECHOGENIC FOCUS, ANTEPARTUM, SINGLE OR UNSPECIFIED FETUS: ICD-10-CM

## 2024-06-17 DIAGNOSIS — Z3A.35 35 WEEKS GESTATION OF PREGNANCY: ICD-10-CM

## 2024-06-17 DIAGNOSIS — O99.320 MARIJUANA USE DURING PREGNANCY: ICD-10-CM

## 2024-06-17 DIAGNOSIS — F12.90 MARIJUANA USE DURING PREGNANCY: ICD-10-CM

## 2024-06-17 DIAGNOSIS — D58.2 HEMOGLOBIN C TRAIT (HCC): ICD-10-CM

## 2024-06-17 LAB
GLUCOSE URINE, POC: NEGATIVE
PROTEIN UA: NEGATIVE
RPR: NONREACTIVE

## 2024-06-17 PROCEDURE — 99213 OFFICE O/P EST LOW 20 MIN: CPT | Performed by: OBSTETRICS & GYNECOLOGY

## 2024-06-17 PROCEDURE — 76818 FETAL BIOPHYS PROFILE W/NST: CPT | Performed by: OBSTETRICS & GYNECOLOGY

## 2024-06-17 PROCEDURE — 81002 URINALYSIS NONAUTO W/O SCOPE: CPT | Performed by: OBSTETRICS & GYNECOLOGY

## 2024-06-17 PROCEDURE — 99999 PR OFFICE/OUTPT VISIT,PROCEDURE ONLY: CPT | Performed by: OBSTETRICS & GYNECOLOGY

## 2024-06-17 NOTE — PROGRESS NOTES
Keagan Rivera presents to office today for US/NST.  Patient denies bleeding, LOF, or cramping.   Positive fetal movement.

## 2024-06-17 NOTE — PATIENT INSTRUCTIONS
[id number] [employer].   Care instructions adapted under license by [employer]. If you have questions about a medical condition or this instruction, always ask your healthcare professional. [employer] disclaims any warranty or liability for your use of this information.       Patient Education        Weeks 34 to 36 of Your Pregnancy: Care Instructions  Your belly has grown quite large. It's almost time to give birth! Your baby's lungs are almost ready to breathe air. The skull bones are firm enough to protect your baby's head. But they're soft enough to move down through the birth canal.    You might be wondering what to expect during labor. Because each birth is different, there's no way to know exactly what childbirth will be like for you. Talk to your doctor or midwife about any concerns you have.   You'll probably have a test for group B streptococcus (GBS). GBS is bacteria that can live in the vagina and rectum. GBS can make your baby sick after birth. If you test positive, you'll get antibiotics during labor.     Choose what type of pain relief you would like during labor.  You can choose from a few types, including medicine and non-medicine options. You may want to use several types of pain relief.     Know how medicines can help with pain during labor.  Some medicines lower anxiety and help with some of the pain. Others make your lower body numb so that you will feel less pain.     Tell your doctor about your pain medicine choice.  Do this before you start labor or very early in your labor. You may be able to change your mind during labor.     Learn about the stages of labor.    The first stage includes the early (latent) and active phases of labor. Contractions start in early labor. During active labor, contractions get stronger, last longer, and happen more often. And the cervix opens more rapidly.  The second stage starts when you're ready to push. During this stage, your baby

## 2024-06-17 NOTE — PROGRESS NOTES
24    iCndy Manzano MD  [address] [facility]  [address] [place] [address]     RE:  KEAGAN SHEIKH  : 2005   AGE: 19 y.o.    This report has been created using voice recognition software. It may contain errors which are inherent in voice recognition technology.    Dear Dr. Manzano:    Keagan Sheikh had fetal testing performed today for the following indications:    Patient Active Problem List   Diagnosis    Marijuana use during pregnancy    Alcohol consumption during pregnancy in first trimester    Vapes nicotine containing substance    Fetal cardiac echogenic focus, antepartum    Hemoglobin C trait (HCC)    Iron deficiency anemia, unspecified    Elevated liver enzymes    Positive urine drug screen     Keagan Sheikh is a 19 y.o. female, who is G1(0). She has an Estimated Date of Delivery: 2024 based on her established dates.  She is currently 35 weeks 3 days gestation based on that assessment.    The patient states that her fetal movement has been good.  She has no vaginal bleeding or leaking of amniotic fluid.    On 3/1/2024 the liver enzymes were elevated, with her ALT 84 U/L (0-32) and her AST 70 U/L (0-31).  Her alkaline phosphatase was 59 units/L which is within normal limits for pregnancy.  Her platelet count was normal.  Her serum ferritin was decreased at 11 ng/mL.  Her serum iron was 34 mcg/dL with decreased saturation at 8%.  Her liver enzymes on 2024 were within normal limits.  Her hepatitis panel was negative.    The results of [id number] screen were negative. I advised her that this a screening test not a diagnostic test. I advised her that the test screens only for trisomy 21, 18 and 13. We discussed the sensitivity of the test which I advised the patient is as follows:      99.1% for detection of trisomy 21 with a specificity of 99.9%   >99.9% for trisomy 18 with a specificity of 99.6%     91.7% for trisomy 13 with a specificity of 99.7%     The patient understands that she may

## 2024-06-18 LAB
CULTURE: NORMAL
SPECIMEN DESCRIPTION: NORMAL

## 2024-06-25 ENCOUNTER — ANCILLARY PROCEDURE (OUTPATIENT)
Dept: OBGYN CLINIC | Age: 19
End: 2024-06-25
Payer: COMMERCIAL

## 2024-06-25 ENCOUNTER — ROUTINE PRENATAL (OUTPATIENT)
Dept: OBGYN CLINIC | Age: 19
End: 2024-06-25
Payer: COMMERCIAL

## 2024-06-25 VITALS
BODY MASS INDEX: 26.04 KG/M2 | SYSTOLIC BLOOD PRESSURE: 116 MMHG | WEIGHT: 151.8 LBS | DIASTOLIC BLOOD PRESSURE: 80 MMHG | HEART RATE: 98 BPM

## 2024-06-25 DIAGNOSIS — O35.BXX0 FETAL CARDIAC ECHOGENIC FOCUS, ANTEPARTUM, SINGLE OR UNSPECIFIED FETUS: ICD-10-CM

## 2024-06-25 DIAGNOSIS — O99.311 ALCOHOL CONSUMPTION DURING PREGNANCY IN FIRST TRIMESTER: ICD-10-CM

## 2024-06-25 DIAGNOSIS — O99.320 MARIJUANA USE DURING PREGNANCY: ICD-10-CM

## 2024-06-25 DIAGNOSIS — Z3A.36 36 WEEKS GESTATION OF PREGNANCY: ICD-10-CM

## 2024-06-25 DIAGNOSIS — F12.90 MARIJUANA USE DURING PREGNANCY: ICD-10-CM

## 2024-06-25 DIAGNOSIS — R82.5 POSITIVE URINE DRUG SCREEN: ICD-10-CM

## 2024-06-25 DIAGNOSIS — D58.2 HEMOGLOBIN C TRAIT (HCC): ICD-10-CM

## 2024-06-25 DIAGNOSIS — R74.8 ELEVATED LIVER ENZYMES: ICD-10-CM

## 2024-06-25 LAB
GLUCOSE URINE, POC: NEGATIVE
PROTEIN UA: POSITIVE

## 2024-06-25 PROCEDURE — 99213 OFFICE O/P EST LOW 20 MIN: CPT | Performed by: OBSTETRICS & GYNECOLOGY

## 2024-06-25 PROCEDURE — 76818 FETAL BIOPHYS PROFILE W/NST: CPT | Performed by: OBSTETRICS & GYNECOLOGY

## 2024-06-25 PROCEDURE — 76820 UMBILICAL ARTERY ECHO: CPT | Performed by: OBSTETRICS & GYNECOLOGY

## 2024-06-25 PROCEDURE — 99999 PR OFFICE/OUTPT VISIT,PROCEDURE ONLY: CPT | Performed by: OBSTETRICS & GYNECOLOGY

## 2024-06-25 PROCEDURE — 81002 URINALYSIS NONAUTO W/O SCOPE: CPT | Performed by: OBSTETRICS & GYNECOLOGY

## 2024-06-26 ENCOUNTER — TELEPHONE (OUTPATIENT)
Dept: OBGYN | Age: 19
End: 2024-06-26

## 2024-06-26 DIAGNOSIS — R73.09 ABNORMAL GLUCOSE TOLERANCE TEST: Primary | ICD-10-CM

## 2024-06-26 NOTE — PROGRESS NOTES
Keagan Rivera presents to office today for BPP/NST.  Patient denies bleeding, LOF, or cramping.   Positive fetal movement. States that she thought maybe she had decreased FM yesterday, but not today.    
Glucose    Trace for Albumin    IMPRESSION:    1.  IUP at 36 weeks 5 days Estimated Date of Delivery: 2024    2.  MaterniT 21 screen showed no increased risk for fetal aneuploidy for the chromosomes assessed.    3.  Nicotine vaper product user    4.  Alcohol use in pregnancy     5.  Marijuana use in pregnancy     6.  Hemoglobin C trait.  31.9% HbC identified on electrophoresis    7.  Father to the baby stated he does not have a hemoglobinopathy    8.  Hypochromic, microcytic anemia    9.  Low serum ferritin 3/1/2024, scheduled for outpatient iron infusion therapy  10.  Elevated liver enzymes 3/1/2024  11.  Normal cervical length without funneling of the amniotic membranes 3/6/2024  12.  Normal interval fetal growth.  Estimated fetal weight 59th growth percentile on 2024  13.  Fetal echocardiogram demonstrated a aneurysmal atrial septum.  14.  Reactive nonstress test 2024  15.  Reassuring biophysical profile and cord Doppler testing on 2024    PLAN:  I would recommend that the patient count fetal movements and call if she notices any subjective decrease in fetal movements, particularly if there are less than 10 major movements in 2 hours. Non-stress testing should be performed every 3 to 4 days through the balance of the pregnancy. Serial ultrasounds to assess fetal anatomy and growth should be performed. The patient is at increase risk for  morbidity and mortality secondary to her history. Weekly BPP and cord Doppler testing should be performed, unless there is a clinical indication to perform the testing more frequently.    The patient was advised to call if she has any increased vaginal discharge, vaginal bleeding, contractions, abdominal pain, back pain or any new significant symptomatology prior to her next visit. I advised her that these are signs and symptoms of cervical change and require follow-up assessment when they occur.    I requested the patient return for a follow-up

## 2024-06-26 NOTE — TELEPHONE ENCOUNTER
Called patient, unable to reach or leave voicemail. Glucola elevated, will need 3 hour, order placed. GBS negative. Labs otherwise wnl.

## 2024-07-01 ENCOUNTER — CLINICAL DOCUMENTATION (OUTPATIENT)
Dept: OBGYN | Age: 19
End: 2024-07-01

## 2024-07-01 NOTE — PROGRESS NOTES
UPDATE:Patient withdrew from centering back in March of this year 2024 due to work/school schedule conflict.

## 2024-07-05 ENCOUNTER — ANCILLARY PROCEDURE (OUTPATIENT)
Dept: OBGYN CLINIC | Age: 19
End: 2024-07-05
Payer: COMMERCIAL

## 2024-07-05 ENCOUNTER — ROUTINE PRENATAL (OUTPATIENT)
Dept: OBGYN CLINIC | Age: 19
End: 2024-07-05
Payer: COMMERCIAL

## 2024-07-05 VITALS
DIASTOLIC BLOOD PRESSURE: 75 MMHG | BODY MASS INDEX: 26.08 KG/M2 | SYSTOLIC BLOOD PRESSURE: 111 MMHG | HEART RATE: 87 BPM | WEIGHT: 152 LBS

## 2024-07-05 DIAGNOSIS — R82.5 POSITIVE URINE DRUG SCREEN: Primary | ICD-10-CM

## 2024-07-05 DIAGNOSIS — O99.320 MARIJUANA USE DURING PREGNANCY: ICD-10-CM

## 2024-07-05 DIAGNOSIS — O35.BXX0 FETAL CARDIAC ECHOGENIC FOCUS, ANTEPARTUM, SINGLE OR UNSPECIFIED FETUS: ICD-10-CM

## 2024-07-05 DIAGNOSIS — Z72.0 VAPES NICOTINE CONTAINING SUBSTANCE: ICD-10-CM

## 2024-07-05 DIAGNOSIS — R74.8 ELEVATED LIVER ENZYMES: ICD-10-CM

## 2024-07-05 DIAGNOSIS — F12.90 MARIJUANA USE DURING PREGNANCY: ICD-10-CM

## 2024-07-05 DIAGNOSIS — O99.311 ALCOHOL CONSUMPTION DURING PREGNANCY IN FIRST TRIMESTER: ICD-10-CM

## 2024-07-05 LAB
GLUCOSE URINE, POC: NORMAL
PROTEIN UA: NEGATIVE

## 2024-07-05 PROCEDURE — 99213 OFFICE O/P EST LOW 20 MIN: CPT | Performed by: OBSTETRICS & GYNECOLOGY

## 2024-07-05 PROCEDURE — 81002 URINALYSIS NONAUTO W/O SCOPE: CPT | Performed by: OBSTETRICS & GYNECOLOGY

## 2024-07-05 PROCEDURE — 76816 OB US FOLLOW-UP PER FETUS: CPT | Performed by: OBSTETRICS & GYNECOLOGY

## 2024-07-05 PROCEDURE — 76818 FETAL BIOPHYS PROFILE W/NST: CPT | Performed by: OBSTETRICS & GYNECOLOGY

## 2024-07-05 PROCEDURE — 76820 UMBILICAL ARTERY ECHO: CPT | Performed by: OBSTETRICS & GYNECOLOGY

## 2024-07-05 NOTE — PROGRESS NOTES
24    Cindy Manzano MD  [address] [facility]  [address] [place] [address]     RE:  KEAGAN SHEIKH  : 2005   AGE: 19 y.o.    This report has been created using voice recognition software. It may contain errors which are inherent in voice recognition technology.    Dear Dr. Manzano:    Keagan Sheikh had fetal testing performed today for the following indications:    Patient Active Problem List   Diagnosis    Marijuana use during pregnancy    Alcohol consumption during pregnancy in first trimester    Vapes nicotine containing substance    Fetal cardiac echogenic focus, antepartum    Hemoglobin C trait (HCC)    Iron deficiency anemia, unspecified    Elevated liver enzymes    Positive urine drug screen     Keagan Sheikh is a 19 y.o. female, who is G1(0). She has an Estimated Date of Delivery: 2024 based on her established dates.  She is currently 38 weeks 0 days gestation based on that assessment.    The patient has no vaginal bleeding or leaking of amniotic fluid.    On 3/1/2024 the liver enzymes were elevated, with her ALT 84 U/L (0-32) and her AST 70 U/L (0-31).  Her alkaline phosphatase was 59 units/L which is within normal limits for pregnancy.  Her platelet count was normal.  Her serum ferritin was decreased at 11 ng/mL.  Her serum iron was 34 mcg/dL with decreased saturation at 8%.  Her liver enzymes on 2024 were within normal limits.  Her hepatitis panel was negative.    The results of WrouedsA41 screen were negative. I advised her that this a screening test not a diagnostic test. I advised her that the test screens only for trisomy 21, 18 and 13. We discussed the sensitivity of the test which I advised the patient is as follows:      99.1% for detection of trisomy 21 with a specificity of 99.9%   >99.9% for trisomy 18 with a specificity of 99.6%     91.7% for trisomy 13 with a specificity of 99.7%     The patient understands that she may be a candidate for diagnostic genetic testing

## 2024-07-05 NOTE — PROGRESS NOTES
Keagan Rivera presents to office today for  Patient denies bleeding, LOF, or cramping.   Decreased fetal movement.  Concerned because someone told her \"just because the baby moves doesn't mean it's alive.\"

## 2024-07-05 NOTE — PATIENT INSTRUCTIONS
Week 39 of Your Pregnancy: Care Instructions  [place] babies can look different than what you see in pictures or movies. Their heads can be a strange shape right after birth. And they may have swollen eyes and red marks on their faces.   You can still get pregnant even if you are breastfeeding. If you don't want to get pregnant, use birth control.      Prepare to breastfeed.   Continue to eat healthy foods.  Keep taking your prenatal vitamins during breastfeeding if your doctor recommends it.  Talk to your doctor before taking any medicines or supplements.     Choose the right birth control for you.   Intrauterine devices (IUDs) are placed in the uterus. If you plan ahead, the IUD can be placed right after giving birth. They work for years.  Hormonal implants are placed under the skin of the arm. They also work for years.  Depo-Provera is a shot. You get it every 3 months.  Birth control pills can be used. They're taken every day.  Tubal ligation (tying your tubes) and vasectomy are surgeries. They're permanent.  Diaphragms, spermicide, and condoms must be used each time you have sex. If you used a diaphragm before, you should get refitted after the baby is born.  Follow-up care is a key part of your treatment and safety. Be sure to make and go to all appointments, and call your doctor if you are having problems. It's also a good idea to know your test results and keep a list of the medicines you take.  Where can you learn more?  Go to https://www.[employer].net/patientEd and enter A811 to learn more about \"Week 39 of Your Pregnancy: Care Instructions.\"  Current as of: 2022               Content Version: 13.5  © 7476-0128 [employer].   Care instructions adapted under license by [employer]. If you have questions about a medical condition or this instruction, always ask your healthcare professional. [employer] disclaims any warranty or liability for your use of this

## 2024-07-09 ENCOUNTER — TELEPHONE (OUTPATIENT)
Dept: OBGYN | Age: 19
End: 2024-07-09

## 2024-07-09 ENCOUNTER — ANESTHESIA EVENT (OUTPATIENT)
Dept: LABOR AND DELIVERY | Age: 19
End: 2024-07-09
Payer: COMMERCIAL

## 2024-07-09 ENCOUNTER — ANESTHESIA (OUTPATIENT)
Dept: LABOR AND DELIVERY | Age: 19
End: 2024-07-09
Payer: COMMERCIAL

## 2024-07-09 ENCOUNTER — HOSPITAL ENCOUNTER (INPATIENT)
Age: 19
LOS: 3 days | Discharge: HOME | End: 2024-07-12
Attending: OBSTETRICS & GYNECOLOGY | Admitting: OBSTETRICS & GYNECOLOGY
Payer: COMMERCIAL

## 2024-07-09 PROBLEM — Z3A.38 38 WEEKS GESTATION OF PREGNANCY: Status: ACTIVE | Noted: 2024-07-09

## 2024-07-09 LAB
ABO + RH BLD: NORMAL
AMNISURE, POC: NEGATIVE
AMPHET UR QL SCN: NEGATIVE
ARM BAND NUMBER: NORMAL
BARBITURATES UR QL SCN: NEGATIVE
BENZODIAZ UR QL: NEGATIVE
BLOOD BANK SAMPLE EXPIRATION: NORMAL
BLOOD GROUP ANTIBODIES SERPL: NEGATIVE
BUPRENORPHINE UR QL: NEGATIVE
CANNABINOIDS UR QL SCN: POSITIVE
COCAINE UR QL SCN: NEGATIVE
ERYTHROCYTE [DISTWIDTH] IN BLOOD BY AUTOMATED COUNT: 13.1 % (ref 11.5–15)
FENTANYL UR QL: NEGATIVE
HCT VFR BLD AUTO: 37.5 % (ref 34–48)
HGB BLD-MCNC: 13.7 G/DL (ref 11.5–15.5)
Lab: NORMAL
MCH RBC QN AUTO: 30.9 PG (ref 26–35)
MCHC RBC AUTO-ENTMCNC: 36.5 G/DL (ref 32–34.5)
MCV RBC AUTO: 84.7 FL (ref 80–99.9)
METHADONE UR QL: NEGATIVE
NEGATIVE QC PASS/FAIL: NORMAL
OPIATES UR QL SCN: NEGATIVE
OXYCODONE UR QL SCN: NEGATIVE
PCP UR QL SCN: NEGATIVE
PLATELET # BLD AUTO: 259 K/UL (ref 130–450)
PMV BLD AUTO: 9.3 FL (ref 7–12)
POSITIVE QC PASS/FAIL: NORMAL
RBC # BLD AUTO: 4.43 M/UL (ref 3.5–5.5)
TEST INFORMATION: ABNORMAL
WBC OTHER # BLD: 13.3 K/UL (ref 4.5–11.5)

## 2024-07-09 PROCEDURE — 99221 1ST HOSP IP/OBS SF/LOW 40: CPT | Performed by: PHYSICIAN ASSISTANT

## 2024-07-09 PROCEDURE — G0480 DRUG TEST DEF 1-7 CLASSES: HCPCS

## 2024-07-09 PROCEDURE — 3700000025 EPIDURAL BLOCK: Performed by: ANESTHESIOLOGY

## 2024-07-09 PROCEDURE — 51701 INSERT BLADDER CATHETER: CPT

## 2024-07-09 PROCEDURE — 86900 BLOOD TYPING SEROLOGIC ABO: CPT

## 2024-07-09 PROCEDURE — 85027 COMPLETE CBC AUTOMATED: CPT

## 2024-07-09 PROCEDURE — 86901 BLOOD TYPING SEROLOGIC RH(D): CPT

## 2024-07-09 PROCEDURE — 84112 EVAL AMNIOTIC FLUID PROTEIN: CPT

## 2024-07-09 PROCEDURE — 1220000001 HC SEMI PRIVATE L&D R&B

## 2024-07-09 PROCEDURE — G0378 HOSPITAL OBSERVATION PER HR: HCPCS

## 2024-07-09 PROCEDURE — 86644 CMV ANTIBODY: CPT

## 2024-07-09 PROCEDURE — 86762 RUBELLA ANTIBODY: CPT

## 2024-07-09 PROCEDURE — 86777 TOXOPLASMA ANTIBODY: CPT

## 2024-07-09 PROCEDURE — 86695 HERPES SIMPLEX TYPE 1 TEST: CPT

## 2024-07-09 PROCEDURE — 6370000000 HC RX 637 (ALT 250 FOR IP): Performed by: OBSTETRICS & GYNECOLOGY

## 2024-07-09 PROCEDURE — 86850 RBC ANTIBODY SCREEN: CPT

## 2024-07-09 PROCEDURE — APPNB30 APP NON BILLABLE TIME 0-30 MINS: Performed by: PHYSICIAN ASSISTANT

## 2024-07-09 PROCEDURE — 80307 DRUG TEST PRSMV CHEM ANLYZR: CPT

## 2024-07-09 PROCEDURE — 2500000003 HC RX 250 WO HCPCS: Performed by: NURSE ANESTHETIST, CERTIFIED REGISTERED

## 2024-07-09 PROCEDURE — 86696 HERPES SIMPLEX TYPE 2 TEST: CPT

## 2024-07-09 PROCEDURE — 6360000002 HC RX W HCPCS: Performed by: OBSTETRICS & GYNECOLOGY

## 2024-07-09 RX ORDER — METHYLERGONOVINE MALEATE 0.2 MG/ML
200 INJECTION INTRAVENOUS PRN
Status: DISCONTINUED | OUTPATIENT
Start: 2024-07-09 | End: 2024-07-12 | Stop reason: HOSPADM

## 2024-07-09 RX ORDER — ACETAMINOPHEN 500 MG
1000 TABLET ORAL EVERY 8 HOURS SCHEDULED
Status: DISCONTINUED | OUTPATIENT
Start: 2024-07-09 | End: 2024-07-12 | Stop reason: HOSPADM

## 2024-07-09 RX ORDER — SODIUM CHLORIDE, SODIUM LACTATE, POTASSIUM CHLORIDE, AND CALCIUM CHLORIDE .6; .31; .03; .02 G/100ML; G/100ML; G/100ML; G/100ML
500 INJECTION, SOLUTION INTRAVENOUS PRN
Status: DISCONTINUED | OUTPATIENT
Start: 2024-07-09 | End: 2024-07-12 | Stop reason: HOSPADM

## 2024-07-09 RX ORDER — MISOPROSTOL 200 UG/1
400 TABLET ORAL PRN
Status: DISCONTINUED | OUTPATIENT
Start: 2024-07-09 | End: 2024-07-12 | Stop reason: HOSPADM

## 2024-07-09 RX ORDER — LIDOCAINE HYDROCHLORIDE 10 MG/ML
INJECTION, SOLUTION INFILTRATION; PERINEURAL
Status: DISPENSED
Start: 2024-07-09 | End: 2024-07-10

## 2024-07-09 RX ORDER — NALOXONE HYDROCHLORIDE 0.4 MG/ML
INJECTION, SOLUTION INTRAMUSCULAR; INTRAVENOUS; SUBCUTANEOUS PRN
Status: DISCONTINUED | OUTPATIENT
Start: 2024-07-09 | End: 2024-07-12 | Stop reason: HOSPADM

## 2024-07-09 RX ORDER — TERBUTALINE SULFATE 1 MG/ML
0.25 INJECTION, SOLUTION SUBCUTANEOUS
Status: ACTIVE | OUTPATIENT
Start: 2024-07-09 | End: 2024-07-10

## 2024-07-09 RX ORDER — SODIUM CHLORIDE 9 MG/ML
25 INJECTION, SOLUTION INTRAVENOUS PRN
Status: DISCONTINUED | OUTPATIENT
Start: 2024-07-09 | End: 2024-07-12 | Stop reason: HOSPADM

## 2024-07-09 RX ORDER — SODIUM CHLORIDE 0.9 % (FLUSH) 0.9 %
5-40 SYRINGE (ML) INJECTION EVERY 12 HOURS SCHEDULED
Status: DISCONTINUED | OUTPATIENT
Start: 2024-07-09 | End: 2024-07-12 | Stop reason: HOSPADM

## 2024-07-09 RX ORDER — ONDANSETRON 2 MG/ML
4 INJECTION INTRAMUSCULAR; INTRAVENOUS EVERY 6 HOURS PRN
Status: DISCONTINUED | OUTPATIENT
Start: 2024-07-09 | End: 2024-07-09 | Stop reason: SDUPTHER

## 2024-07-09 RX ORDER — SODIUM CHLORIDE 0.9 % (FLUSH) 0.9 %
5-40 SYRINGE (ML) INJECTION PRN
Status: DISCONTINUED | OUTPATIENT
Start: 2024-07-09 | End: 2024-07-12 | Stop reason: HOSPADM

## 2024-07-09 RX ORDER — CARBOPROST TROMETHAMINE 250 UG/ML
250 INJECTION, SOLUTION INTRAMUSCULAR PRN
Status: DISCONTINUED | OUTPATIENT
Start: 2024-07-09 | End: 2024-07-12 | Stop reason: HOSPADM

## 2024-07-09 RX ORDER — TRANEXAMIC ACID 10 MG/ML
1000 INJECTION, SOLUTION INTRAVENOUS
Status: ACTIVE | OUTPATIENT
Start: 2024-07-09 | End: 2024-07-10

## 2024-07-09 RX ORDER — ONDANSETRON 4 MG/1
4 TABLET, ORALLY DISINTEGRATING ORAL EVERY 6 HOURS PRN
Status: DISCONTINUED | OUTPATIENT
Start: 2024-07-09 | End: 2024-07-12 | Stop reason: HOSPADM

## 2024-07-09 RX ORDER — SODIUM CHLORIDE, SODIUM LACTATE, POTASSIUM CHLORIDE, CALCIUM CHLORIDE 600; 310; 30; 20 MG/100ML; MG/100ML; MG/100ML; MG/100ML
INJECTION, SOLUTION INTRAVENOUS CONTINUOUS
Status: DISCONTINUED | OUTPATIENT
Start: 2024-07-09 | End: 2024-07-12 | Stop reason: HOSPADM

## 2024-07-09 RX ORDER — ONDANSETRON 4 MG/1
4 TABLET, ORALLY DISINTEGRATING ORAL EVERY 8 HOURS PRN
Status: DISCONTINUED | OUTPATIENT
Start: 2024-07-09 | End: 2024-07-09 | Stop reason: SDUPTHER

## 2024-07-09 RX ORDER — EPHEDRINE SULFATE/0.9% NACL/PF 25 MG/5 ML
5 SYRINGE (ML) INTRAVENOUS PRN
Status: DISCONTINUED | OUTPATIENT
Start: 2024-07-10 | End: 2024-07-12 | Stop reason: HOSPADM

## 2024-07-09 RX ORDER — EPHEDRINE SULFATE/0.9% NACL/PF 25 MG/5 ML
10 SYRINGE (ML) INTRAVENOUS
Status: ACTIVE | OUTPATIENT
Start: 2024-07-09 | End: 2024-07-10

## 2024-07-09 RX ORDER — ONDANSETRON 2 MG/ML
4 INJECTION INTRAMUSCULAR; INTRAVENOUS EVERY 6 HOURS PRN
Status: DISCONTINUED | OUTPATIENT
Start: 2024-07-09 | End: 2024-07-12 | Stop reason: HOSPADM

## 2024-07-09 RX ORDER — ACETAMINOPHEN 650 MG
TABLET, EXTENDED RELEASE ORAL
Status: DISPENSED
Start: 2024-07-09 | End: 2024-07-10

## 2024-07-09 RX ADMIN — ACETAMINOPHEN 1000 MG: 500 TABLET ORAL at 12:53

## 2024-07-09 RX ADMIN — ONDANSETRON 4 MG: 2 INJECTION INTRAMUSCULAR; INTRAVENOUS at 19:12

## 2024-07-09 RX ADMIN — Medication 5 ML: at 16:47

## 2024-07-09 RX ADMIN — Medication 5 ML: at 17:57

## 2024-07-09 RX ADMIN — Medication 15 ML/HR: at 16:48

## 2024-07-09 ASSESSMENT — PAIN DESCRIPTION - LOCATION: LOCATION: ABDOMEN

## 2024-07-09 ASSESSMENT — LIFESTYLE VARIABLES: SMOKING_STATUS: 1

## 2024-07-09 ASSESSMENT — PAIN SCALES - GENERAL: PAINLEVEL_OUTOF10: 7

## 2024-07-09 ASSESSMENT — PAIN DESCRIPTION - DESCRIPTORS: DESCRIPTORS: CRAMPING;DISCOMFORT

## 2024-07-09 NOTE — ANESTHESIA PROCEDURE NOTES
Epidural Block    Patient location during procedure: OB  Start time: 7/9/2024 5:45 PM  End time: 7/9/2024 5:57 PM  Reason for block: labor epidural  Staffing  Performed: resident/CRNA   Anesthesiologist: Todd Agarwal MD  Resident/CRNA: Wyatt Randall APRN - CRNA  Performed by: Wyatt Randall APRN - CRNA  Authorized by: Todd Agarwal MD    Epidural  Patient position: sitting  Prep: ChloraPrep  Patient monitoring: continuous pulse ox and frequent blood pressure checks  Approach: midline  Location: L3-4  Injection technique: AHSAN air  Provider prep: mask and sterile gloves  Needle  Needle type: Tuohy   Needle gauge: 17 G  Needle length: 3.5 in  Needle insertion depth: 4 cm  Catheter type: end hole  Catheter size: 20 G  Catheter at skin depth: 15 cm  Test dose: negativeCatheter Secured: tegaderm and tape  Assessment  Sensory level: T6  Hemodynamics: stable  Attempts: 1  Outcomes: uncomplicated and patient tolerated procedure well  Additional Notes  Called to BS to evaluate previous epidural. Deemed non-functional. Elect to replace. No complications.  Preanesthetic Checklist  Completed: patient identified, IV checked, site marked, risks and benefits discussed, surgical/procedural consents, equipment checked, pre-op evaluation, timeout performed, anesthesia consent given, oxygen available, monitors applied/VS acknowledged, fire risk safety assessment completed and verbalized and blood product R/B/A discussed and consented

## 2024-07-09 NOTE — ANESTHESIA PRE PROCEDURE
Department of Anesthesiology  Preprocedure Note       Name:  Keagan Rivera   Age:  19 y.o.  :  2005                                          MRN:  [id number]         Date:  2024      Surgeon: * No surgeons listed *    Procedure: labor analgesia    Medications prior to admission:   Prior to Admission medications    Medication Sig Start Date End Date Taking? Authorizing Provider   Prenatal Vit-Fe Fumarate-FA (PRENATAL VITAMINS) 28-0.8 MG TABS Take 1 tablet by mouth daily 3/29/24   Cindy Manzano MD       Current medications:    Current Facility-Administered Medications   Medication Dose Route Frequency Provider Last Rate Last Admin    ondansetron (ZOFRAN-ODT) disintegrating tablet 4 mg  4 mg Oral Q8H PRN Jony Jain MD        Or    ondansetron (ZOFRAN) injection 4 mg  4 mg IntraVENous Q6H PRN Jony Jain MD        acetaminophen (TYLENOL) tablet 1,000 mg  1,000 mg Oral 3 times per day Jony Jain MD   1,000 mg at 24 1253    povidone-iodine (BETADINE) 10 % external solution             lidocaine 1 % injection             oxytocin (PITOCIN) 15 units in 250 mL infusion Override Pull                Allergies:  No Known Allergies    Problem List:    Patient Active Problem List   Diagnosis Code    Marijuana use during pregnancy O99.320, F12.90    Alcohol consumption during pregnancy in first trimester O99.311    Vapes nicotine containing substance Z72.0    Fetal cardiac echogenic focus, antepartum O35.BXX0    Hemoglobin C trait (HCC) D58.2    Iron deficiency anemia, unspecified D50.9    Elevated liver enzymes R74.8    Positive urine drug screen R82.5    38 weeks gestation of pregnancy Z3A.38       Past Medical History:        Diagnosis Date    Migraine        Past Surgical History:  No past surgical history on file.    Social History:    Social History     Tobacco Use    Smoking status: Every Day     Current packs/day: 1.00     Types: Cigarettes    Smokeless tobacco: Never   Substance Use Topics

## 2024-07-09 NOTE — PROGRESS NOTES
Dr Jain at nurse's station. Notified of patient's contraction pattern every 4-6minutes patient states she is still feeling them. Notified patient received Tylenol. Verbal orders to place cytotec 25mcg if contractions space out.

## 2024-07-09 NOTE — ANESTHESIA PROCEDURE NOTES
Epidural Block    Patient location during procedure: OB  Start time: 7/9/2024 4:26 PM  End time: 7/9/2024 4:51 PM  Reason for block: labor epidural  Staffing  Performed: other anesthesia staff   Anesthesiologist: Todd Agarwal MD  Resident/CRNA: Wyatt Randall APRN - CRNA  Other anesthesia staff: Ranulfo Prado RN  Performed by: Ranulfo Prado RN  Authorized by: Todd Agarwal MD    Epidural  Patient position: sitting  Prep: ChloraPrep  Patient monitoring: continuous pulse ox and frequent blood pressure checks  Approach: midline  Location: L3-4  Injection technique: AHSAN air  Provider prep: mask  Needle  Needle type: Tuohy   Needle gauge: 18 G  Needle length: 3.5 in  Needle insertion depth: 5 cm  Catheter type: end hole  Catheter size: 19 G  Catheter at skin depth: 15 cm  Test dose: negativeCatheter Secured: tegaderm and tape  Assessment  Sensory level: T6  Hemodynamics: stable  Attempts: 1  Outcomes: uncomplicated and patient tolerated procedure well  Preanesthetic Checklist  Completed: patient identified, IV checked, site marked, risks and benefits discussed, surgical/procedural consents, equipment checked, pre-op evaluation, timeout performed, anesthesia consent given, oxygen available, monitors applied/VS acknowledged, fire risk safety assessment completed and verbalized and blood product R/B/A discussed and consented

## 2024-07-09 NOTE — PROGRESS NOTES
Patient presents to unit G1P 0  at 38weeks 4 days with complaints of Rupture of membranes: Date/time: between 8739-6035 and Amount: one gush but no continued leaking. Pt also reports that she had some light pink discharge when wiping when she woke up and had some cramping when enroute to hospital. Denies LOF, VB & CTX.  Good fetal movement noted by patient.  Patient placed on EFM. Call light within reach.

## 2024-07-09 NOTE — H&P
Department of Obstetrics and Gynecology  Physician Assistant Obstetrics History and Physical      HISTORY OF PRESENT ILLNESS:      The patient is a 19 y.o.  1 parity 0 at 38 weeks' 4 days' gestation presents to L&D Antepartum c/o possible ROM between 23:00 and 00:00. Some pink discharge was noted when wiping when she woke up this am. Reports one gush, but no continued leakage. Amnisure pending, negative result. Feeling regular contractions, not  sure how often.    Current obstetric history is significant for:  Primigravida    Estimated Due Date:  2024  Contractions:Yes  Leaking of fluid: Yes, Amnisure (-)  Bleeding:  No  Perceived fetal movement: Good        PAST OB HISTORY:  OB History    Para Term  AB Living   1             SAB IAB Ectopic Molar Multiple Live Births                    # Outcome Date GA Lbr Italo/2nd Weight Sex Delivery Anes PTL Lv   1 Current                Past Medical History:    Diagnosis Date    Migraine         Past Surgical History:    No past surgical history on file.     Social History:    Reports that she has quit smoking. Her smoking use included cigarettes. She has never used smokeless tobacco. She reports that she does not currently use alcohol. She reports that she does not currently use drugs after having used the following drugs: Marijuana (Weed).     Family History   Problem Relation Age of Onset    No Known Problems Father     No Known Problems Mother        Blood type: A positive  Antibody screen:   Lab Results   Component Value Date    LABANTI NEGATIVE 2023     CBC:   Lab Results   Component Value Date    WBC 10.4 2024    HGB 13.6 2024    HCT 38.3 2024    MCV 87.2 2024     2024     Rubella: No results found for: \"RUBELLAIGGQT\"  Varicella: NON-IMMUNE  RPR: Non-reactive  Hepatitis B Surface Antigen:   Lab Results   Component Value Date    HEPBSAG NONREACTIVE 2024   HIV:   Lab Results   Component Value Date     HIVAG/AB NONREACTIVE 2023   Gonorrhea: Negative  Chlamydia: Negative  Group B Strep: Negative    Medications Prior to Admission:  Medications Prior to Admission: Prenatal Vit-Fe Fumarate-FA (PRENATAL VITAMINS) 28-0.8 MG TABS, Take 1 tablet by mouth daily    Allergies:  Patient has no known allergies.    ROS:  Const: No fever, chills, night sweats, no recent unexplained weight gain/loss  HEENT: No blurred vision, double vision; no ear problems; no sore throat, congestion; no running nose.  Resp: No cough, no sputum, no pleuritic chest pain, no sob  Cardio: No chest pain, no exertional dyspnea, no PND, no orthopnea, no palpitation, no leg swelling.   GI: No dysphagia, no reflux; no abdominal pain, no n/v; no c/d. No hematochezia    : No dysuria, no frequency, hesitancy; no hematuria  MSK: no joint pain, no myalgia, no change in ROM  Neuro: no focal weakness in extremities, no slurred speech, no double vision, no numbness or tingling in extremities  Endo: no heat/cold intolerance, no polyphagia, polydipsia or polyuria  Hem: no increased bleeding, no bruising, no lymphadenopathy  Skin: no skin changes  Psych: no depressed mood, no suicidal ideation  Pertinent +'s & -'s addressed in HPI    PHYSICAL EXAM:  /66   Pulse 65   Temp 98.7 °F (37.1 °C) (Oral)   Resp 14   LMP 10/13/2023     General appearance: Comfortable  Lungs:  CTA bilaterally, good excursion  Heart:  Regular Rate & Rhythm, no murmur noted  Abdomen:  Soft, non-tender, gravid  Uterus: soft, nontender  Fetal heart rate:  Baseline Heart Rate 130; variability: moderate;  accelerations: present;  decelerations: absent  Cervix: SVE per Dr. Jain  DILATION: fingertip  EFFACEMENT:   70%  STATION:  -3 cm  Presentation: vertex  Contraction frequency:  5 minutes  Membranes:  Intact: Amnisure (-)  Back: (-) CVA tenderness.  Extremities: (-) edema      ASSESSMENT:   20 yo  IUP at 38 weeks' 4 days' gestation    R/o ROM, contractions    Amnisure

## 2024-07-09 NOTE — PROGRESS NOTES
Assumed patient care. Patient resting comfortably in bed at this time. States comfortable with epidural.

## 2024-07-09 NOTE — PROGRESS NOTES
Keagan Rivera is a 19 y.o. female, , Patient's last menstrual period was 10/13/2023., Estimated Date of Delivery: 24, 38w4d.    Called to perform AROM on this patient.   History reviewed and there is no significant change except: physician order  GBS is negative   Contractions are irregular, FH: 139    VAGINAL EXAM:    Cervical dilatation: 4, Effacement: 100, Presentation: Vertex, Station: -1.    AROM performed.  Amniotic Fluid: Meconium stained.     TANESHA Lopez performed AROM    DAVION Chopra CNM

## 2024-07-09 NOTE — TELEPHONE ENCOUNTER
Patient called in with c/o some spotting while wiping this morning. Currently 38w4d, next appointment is Friday, 7/12. Denies any other bleeding, abdominal or back pain. Per patient, had some \"tightening\" of the abdomen yesterday with some discharge.   Please advise.

## 2024-07-10 LAB
ABNORMAL SPECIMEN VALIDITY TEST: NORMAL
COMPLIANCE DRUG ANALYSIS, URINE: NORMAL
INTEGRITY CHECK, CREATININE, URINE: 192.6 MG/DL (ref 22–250)
INTEGRITY CHECK, OXIDANT, URINE: <40 MG/L
INTEGRITY CHECK, PH, URINE: 7.6 (ref 4.5–9)
INTEGRITY CHECK, SPECIFIC GRAVITY, URINE: 1.01 (ref 1–1.03)
THC NORMALIZED, QUANTITIATIVE, URINE: 156.4 NG/ML
THC-COOH, QUANTITATIVE, URINE: 301.3 NG/ML

## 2024-07-10 PROCEDURE — 6370000000 HC RX 637 (ALT 250 FOR IP): Performed by: OBSTETRICS & GYNECOLOGY

## 2024-07-10 PROCEDURE — 6370000000 HC RX 637 (ALT 250 FOR IP): Performed by: MIDWIFE

## 2024-07-10 PROCEDURE — 2580000003 HC RX 258: Performed by: MIDWIFE

## 2024-07-10 PROCEDURE — 0HQ9XZZ REPAIR PERINEUM SKIN, EXTERNAL APPROACH: ICD-10-PCS | Performed by: MIDWIFE

## 2024-07-10 PROCEDURE — 59409 OBSTETRICAL CARE: CPT | Performed by: MIDWIFE

## 2024-07-10 PROCEDURE — 7200000001 HC VAGINAL DELIVERY

## 2024-07-10 PROCEDURE — 1220000000 HC SEMI PRIVATE OB R&B

## 2024-07-10 PROCEDURE — 10907ZC DRAINAGE OF AMNIOTIC FLUID, THERAPEUTIC FROM PRODUCTS OF CONCEPTION, VIA NATURAL OR ARTIFICIAL OPENING: ICD-10-PCS | Performed by: MIDWIFE

## 2024-07-10 RX ORDER — MODIFIED LANOLIN
OINTMENT (GRAM) TOPICAL PRN
Status: DISCONTINUED | OUTPATIENT
Start: 2024-07-10 | End: 2024-07-12 | Stop reason: HOSPADM

## 2024-07-10 RX ORDER — FERROUS SULFATE 325(65) MG
325 TABLET ORAL EVERY OTHER DAY
Status: DISCONTINUED | OUTPATIENT
Start: 2024-07-10 | End: 2024-07-12 | Stop reason: HOSPADM

## 2024-07-10 RX ORDER — ONDANSETRON 2 MG/ML
4 INJECTION INTRAMUSCULAR; INTRAVENOUS EVERY 6 HOURS PRN
Status: DISCONTINUED | OUTPATIENT
Start: 2024-07-10 | End: 2024-07-12 | Stop reason: HOSPADM

## 2024-07-10 RX ORDER — SODIUM CHLORIDE 9 MG/ML
INJECTION, SOLUTION INTRAVENOUS PRN
Status: DISCONTINUED | OUTPATIENT
Start: 2024-07-10 | End: 2024-07-12 | Stop reason: HOSPADM

## 2024-07-10 RX ORDER — SODIUM CHLORIDE 0.9 % (FLUSH) 0.9 %
5-40 SYRINGE (ML) INJECTION EVERY 12 HOURS SCHEDULED
Status: DISCONTINUED | OUTPATIENT
Start: 2024-07-10 | End: 2024-07-12 | Stop reason: HOSPADM

## 2024-07-10 RX ORDER — IBUPROFEN 800 MG/1
800 TABLET ORAL EVERY 8 HOURS SCHEDULED
Status: DISCONTINUED | OUTPATIENT
Start: 2024-07-10 | End: 2024-07-12 | Stop reason: HOSPADM

## 2024-07-10 RX ORDER — DOCUSATE SODIUM 100 MG/1
100 CAPSULE, LIQUID FILLED ORAL 2 TIMES DAILY
Status: DISCONTINUED | OUTPATIENT
Start: 2024-07-10 | End: 2024-07-12 | Stop reason: HOSPADM

## 2024-07-10 RX ORDER — ACETAMINOPHEN 500 MG
1000 TABLET ORAL EVERY 8 HOURS PRN
Status: DISCONTINUED | OUTPATIENT
Start: 2024-07-10 | End: 2024-07-12 | Stop reason: HOSPADM

## 2024-07-10 RX ORDER — ONDANSETRON 4 MG/1
4 TABLET, ORALLY DISINTEGRATING ORAL EVERY 6 HOURS PRN
Status: DISCONTINUED | OUTPATIENT
Start: 2024-07-10 | End: 2024-07-12 | Stop reason: HOSPADM

## 2024-07-10 RX ORDER — SODIUM CHLORIDE 0.9 % (FLUSH) 0.9 %
5-40 SYRINGE (ML) INJECTION PRN
Status: DISCONTINUED | OUTPATIENT
Start: 2024-07-10 | End: 2024-07-12 | Stop reason: HOSPADM

## 2024-07-10 RX ADMIN — DOCUSATE SODIUM 100 MG: 100 CAPSULE, LIQUID FILLED ORAL at 08:44

## 2024-07-10 RX ADMIN — DOCUSATE SODIUM 100 MG: 100 CAPSULE, LIQUID FILLED ORAL at 20:38

## 2024-07-10 RX ADMIN — IBUPROFEN 800 MG: 800 TABLET, FILM COATED ORAL at 23:24

## 2024-07-10 RX ADMIN — IBUPROFEN 800 MG: 800 TABLET, FILM COATED ORAL at 15:21

## 2024-07-10 RX ADMIN — SODIUM CHLORIDE, PRESERVATIVE FREE 10 ML: 5 INJECTION INTRAVENOUS at 20:38

## 2024-07-10 RX ADMIN — ACETAMINOPHEN 1000 MG: 500 TABLET ORAL at 18:36

## 2024-07-10 ASSESSMENT — PAIN DESCRIPTION - ORIENTATION: ORIENTATION: LOWER

## 2024-07-10 ASSESSMENT — PAIN DESCRIPTION - DESCRIPTORS
DESCRIPTORS: DISCOMFORT;CRAMPING
DESCRIPTORS: ACHING;DISCOMFORT;SORE

## 2024-07-10 ASSESSMENT — PAIN DESCRIPTION - LOCATION: LOCATION: ABDOMEN

## 2024-07-10 ASSESSMENT — PAIN - FUNCTIONAL ASSESSMENT
PAIN_FUNCTIONAL_ASSESSMENT: ACTIVITIES ARE NOT PREVENTED
PAIN_FUNCTIONAL_ASSESSMENT: ACTIVITIES ARE NOT PREVENTED

## 2024-07-10 ASSESSMENT — PAIN SCALES - GENERAL
PAINLEVEL_OUTOF10: 6
PAINLEVEL_OUTOF10: 1
PAINLEVEL_OUTOF10: 7

## 2024-07-10 NOTE — L&D DELIVERY NOTE
Robbie Rivera [[id number]]      Labor Events     Labor: No  Cervical Ripening Date/Time:      Antibiotics Received during Labor: No  Rupture Identifier: Sac 1  Rupture Date/Time:  24 19:49:00   Rupture Type: AROM  Fluid Color: Meconium  Fluid Odor: None  Fluid Volume: Moderate  Induction: None  Augmentation: AROM              Anesthesia    Method: Epidural       Labor Event Times      Labor onset date/time:        Dilation complete date/time:  7/10/24 03:12:00 EDT     Start pushing date/time:  7/10/2024 03:22:00   Decision date/time (emergent ):            Delivery Details      Delivery Date: 7/10/24 Delivery Time: 04:38:00   Delivery Type: Vaginal, Spontaneous               Presentation    Presentation: Vertex  _: Occiput       Shoulder Dystocia    Shoulder Dystocia Present?: No       Assisted Delivery Details    Forceps Attempted?: No  Vacuum Extractor Attempted?: No                           Cord    Vessels: 3 Vessels  Complications: None  Delayed Cord Clamping?: Yes  Cord Clamped Date/Time: 7/10/2024 04:39:00  Cord Blood Disposition: Lab  Gases Sent?: Yes              Placenta    Date/Time: 7/10/2024 04:49:45  Removal: Spontaneous  Appearance: Intact  Disposition: Placenta Refrigerator       Lacerations           Vaginal Counts    Initial Count Personnel: DAVION  Initial Count Verified By: RAY  Intial Sponge Count: Correct Intial Needles Count: Correct Intial Instruments Count: Correct   Accurate Final Count?: Yes       Blood Loss  Mother: Keagan Rivera #[id number]     Start of Mother's Information      Delivery Blood Loss  24 1638 - 07/10/24 0501      None                 End of Mother's Information  Mother: Keagan Rivera #[id number]                Delivery Providers    Delivering clinician: Edwieg Cunha, APRN - DYLANM     Provider Role    Jony Jain MD Obstetrician    Leslie Andres RN Primary Nurse    Mary Fu RN Primary  Nurse     NICU

## 2024-07-10 NOTE — PROGRESS NOTES
Name: Keagan Rivera                Lutheran: Rastafarian   Referral: [facility]      Assessment:  Parent(s) declined visit/blessing.        Intervention:       Outcome:       Plan:  Chaplains will remain available to offer spiritual and emotional support as needed.       Electronically signed by АЛЕКСАНДР Pedraza, on 7/10/2024 at 1:41 PM.  [facility]  [facility]  [phone]

## 2024-07-10 NOTE — PROGRESS NOTES
of baby girl at 0438 by DANUTA Cunha CNM. Delayed cord clamping performed. APGARs 9/9. Skin to skin initiated immediately. VSS. Mother and baby bonding well.

## 2024-07-10 NOTE — PROGRESS NOTES
Pt admitted to 322, oriented to room and unit routine, infant safety discussed, admission papers reviewed, pt refusing tdap vaccine

## 2024-07-10 NOTE — PROGRESS NOTES
Edwige NEELY called for an update. Aware of ctx pattern fetal tracing and last SVE. Orders to start pitocin if ctx space. Also aware that there is not a rubella resulted in pt chart. New orders received.

## 2024-07-11 LAB
ERYTHROCYTE [DISTWIDTH] IN BLOOD BY AUTOMATED COUNT: 13.1 % (ref 11.5–15)
HCT VFR BLD AUTO: 32.2 % (ref 34–48)
HGB BLD-MCNC: 11.8 G/DL (ref 11.5–15.5)
MCH RBC QN AUTO: 31.3 PG (ref 26–35)
MCHC RBC AUTO-ENTMCNC: 36.6 G/DL (ref 32–34.5)
MCV RBC AUTO: 85.4 FL (ref 80–99.9)
PLATELET # BLD AUTO: 217 K/UL (ref 130–450)
PMV BLD AUTO: 9.7 FL (ref 7–12)
RBC # BLD AUTO: 3.77 M/UL (ref 3.5–5.5)
WBC OTHER # BLD: 19 K/UL (ref 4.5–11.5)

## 2024-07-11 PROCEDURE — APPNB30 APP NON BILLABLE TIME 0-30 MINS: Performed by: PHYSICIAN ASSISTANT

## 2024-07-11 PROCEDURE — 6370000000 HC RX 637 (ALT 250 FOR IP): Performed by: MIDWIFE

## 2024-07-11 PROCEDURE — 1220000000 HC SEMI PRIVATE OB R&B

## 2024-07-11 PROCEDURE — 2580000003 HC RX 258: Performed by: MIDWIFE

## 2024-07-11 PROCEDURE — 85027 COMPLETE CBC AUTOMATED: CPT

## 2024-07-11 RX ADMIN — DOCUSATE SODIUM 100 MG: 100 CAPSULE, LIQUID FILLED ORAL at 19:59

## 2024-07-11 RX ADMIN — DOCUSATE SODIUM 100 MG: 100 CAPSULE, LIQUID FILLED ORAL at 08:08

## 2024-07-11 RX ADMIN — IBUPROFEN 800 MG: 800 TABLET, FILM COATED ORAL at 08:08

## 2024-07-11 RX ADMIN — SODIUM CHLORIDE, PRESERVATIVE FREE 10 ML: 5 INJECTION INTRAVENOUS at 20:00

## 2024-07-11 RX ADMIN — IBUPROFEN 800 MG: 800 TABLET, FILM COATED ORAL at 16:35

## 2024-07-11 ASSESSMENT — PAIN SCALES - GENERAL
PAINLEVEL_OUTOF10: 5
PAINLEVEL_OUTOF10: 5

## 2024-07-11 ASSESSMENT — PAIN DESCRIPTION - DESCRIPTORS
DESCRIPTORS: ACHING;CRAMPING;DISCOMFORT
DESCRIPTORS: ACHING;CRAMPING;DISCOMFORT

## 2024-07-11 ASSESSMENT — PAIN DESCRIPTION - LOCATION
LOCATION: ABDOMEN
LOCATION: ABDOMEN

## 2024-07-11 NOTE — PLAN OF CARE
Problem: Postpartum  Goal: Experiences normal postpartum course  Description:  Postpartum OB-Pregnancy care plan goal which identifies if the mother is experiencing a normal postpartum course  2024 110 by Shelia Person RN  Outcome: Progressing     Problem: Postpartum  Goal: Appropriate maternal -  bonding  Description:  Postpartum OB-Pregnancy care plan goal which identifies if the mother and  are bonding appropriately  2024 110 by Shelia Person RN  Outcome: Progressing     Problem: Postpartum  Goal: Establishment of infant feeding pattern  Description:  Postpartum OB-Pregnancy care plan goal which identifies if the mother is establishing a feeding pattern with their   2024 110 by Shelia Person RN  Outcome: Progressing

## 2024-07-11 NOTE — CARE COORDINATION
SW Discharge Planning   SW received consult for \"postive marijuana \"  BIN also noted ETOCH usage  BIN met with Keagan Rivera ( [phone]) first time mother to baby girl Ly Rivera ( 7/10/24) and introduced self and role. Also present in the room was Keagan 's mother, Yecenia Ceballos, and reported father of the baby, Jorge Dominguez ( 11/25/00) who Keagan  gave BIN permission to speak freely in front of. Keagan stated that she reside at the address listed in the chart and is currently unemployed; baby will be added to her [employer] medicaid. Per Keagan prenatal care was with Dr. Ha and pediatric care will be with [facility]. Keagan  Reported that she has all needed items including a car seat and pack and play. We discussed safe sleep practices. Keagan stated that she is already involved with WIC and [facility]. Keagan  denied any past or current history of children services involvement, legal issues, substance abuse aside from THC, domestic violence or mental health diagnosis.  We discussed awareness of Post Partum Depression and encouraged contact with her OB if any problems arise.  Keagan did admit to THC and ETOH  usage during pregnancy and expressed understanding for the need of a [facility] ( [phone]) referral. BIN completed [facility] ( [phone]) referral to , Dayanna Bond.    BIN did note per chart review, that despite denying any mental health history mother has a history of suicidal ideation, adjustment disorder and ETOH/THC abuse. Chart review indicated that Keagan has been admitted inpatient at least twice for mental health concerns. Due to this SW did discuss post partum depression and resources       PLAN    Baby can NOT be discharged home until [facility] ( [phone]) provides disposition  SW to continue communication with nursing staff and [facility] ( [phone])      Electronically signed by

## 2024-07-11 NOTE — CARE COORDINATION
SW Discharge Planning     Per [facility] ( [phone]) supervisor, Alyssa Allen, [facility] ( [phone]) will NOT be involved at this time     PLAN    Baby CAN be discharged home when medically ready, children services will NOT be involved at this time.      Electronically signed by GRISELDA Rene on 7/11/2024 at 10:51 AM

## 2024-07-11 NOTE — PLAN OF CARE
Problem: Discharge Planning  Goal: Discharge to home or other facility with appropriate resources  Outcome: Progressing     Problem: Pain  Goal: Verbalizes/displays adequate comfort level or baseline comfort level  Outcome: Progressing  Flowsheets (Taken 7/10/2024 5513)  Verbalizes/displays adequate comfort level or baseline comfort level: Assess pain using appropriate pain scale     Problem: Vaginal Birth or  Section  Goal: Fetal and maternal status remain reassuring during the birth process  Description:  Birth OB-Pregnancy care plan goal which identifies if the fetal and maternal status remain reassuring during the birth process  Outcome: Progressing

## 2024-07-11 NOTE — PROGRESS NOTES
SUBJECTIVE:  Patient without complaint. Voiding without difficulty. Passing flatus. Tolerating regular diet. Normal lochia, denies passing clots. Ambulating in hallway. Formula feeding infant. Denies fever, chills, afebrile and uterus is nontender.    OBJECTIVE:  BP  114/71   Pulse 85   Temp 97. °F (36.6 °C) (Temporal)   Resp 16   Ht 1.626 m (5' 4\")   Wt 68.9 kg (152 lb)   LMP 10/13/2023   SpO2 98%   BMI 26.09 kg/m²   Recent Labs     24  0559 24  1230 24  1016   WBC 19.0* 13.3* 10.4   HGB 11.8 13.7 13.6   HCT 32.2* 37.5 38.3   MCV 85.4 84.7 87.2    259 234      Physical Exam:  General: A&O. Cooperative. Comfortable.  Coronary: RRR  Pulmonary: CTA b/l  Abdomen: soft, nontender.    Uterus firm, well-contracted, nontender  Peripad: Lochia thin rubra  Back: (-) CVA or paraspinal tenderness.   Extremities: (-) edema, (-) calf tenderness.  Neuro: Grossly intact.        ASSESSMENT/PLAN: 19 y.o. female  38w5d s/p  yesterday at 04:38.  Postpartum Day #1  Leukocytosis, afebrile and uterus nontender-repeat CBC in am tomorrow  Advance care  Anticipate discharge tomorrow       Laura Almanza PA-C 2024 7:58 AM

## 2024-07-11 NOTE — ANESTHESIA POSTPROCEDURE EVALUATION
Department of Anesthesiology  Postprocedure Note    Patient: Keagan Rivera  MRN: [id number]  YOB: 2005  Date of evaluation: 7/11/2024    Procedure Summary       Date: 07/09/24 Room / Location:     Anesthesia Start: 1627 Anesthesia Stop: 07/10/24 0438    Procedure: Labor Analgesia Diagnosis:     Scheduled Providers:  Responsible Provider: Todd Agarwal MD    Anesthesia Type: epidural, general, spinal ASA Status: 2            Anesthesia Type: No value filed.    Parisa Phase I:      Parisa Phase II:      Anesthesia Post Evaluation    Patient location during evaluation: bedside  Patient participation: complete - patient participated  Level of consciousness: awake and alert  Pain score: 0  Airway patency: patent  Nausea & Vomiting: no nausea and no vomiting  Cardiovascular status: hemodynamically stable  Respiratory status: acceptable  Hydration status: euvolemic  Comments: Patient satisfied with epidural for labor  Pain management: adequate        No notable events documented.

## 2024-07-12 VITALS
TEMPERATURE: 97.8 F | BODY MASS INDEX: 25.95 KG/M2 | RESPIRATION RATE: 16 BRPM | OXYGEN SATURATION: 99 % | DIASTOLIC BLOOD PRESSURE: 60 MMHG | HEART RATE: 61 BPM | SYSTOLIC BLOOD PRESSURE: 107 MMHG | WEIGHT: 152 LBS | HEIGHT: 64 IN

## 2024-07-12 LAB
BASOPHILS # BLD: 0.06 K/UL (ref 0–0.2)
BASOPHILS NFR BLD: 0 % (ref 0–2)
CMV IGG SERPL QL IA: 607
EOSINOPHIL # BLD: 0.25 K/UL (ref 0.05–0.5)
EOSINOPHILS RELATIVE PERCENT: 2 % (ref 0–6)
ERYTHROCYTE [DISTWIDTH] IN BLOOD BY AUTOMATED COUNT: 13.1 % (ref 11.5–15)
HCT VFR BLD AUTO: 31.3 % (ref 34–48)
HGB BLD-MCNC: 11.6 G/DL (ref 11.5–15.5)
HSV1 IGG SERPL QL IA: 0.28
HSV2 AB SER QL IA: 1.83
IMM GRANULOCYTES # BLD AUTO: 0.07 K/UL (ref 0–0.58)
IMM GRANULOCYTES NFR BLD: 1 % (ref 0–5)
LYMPHOCYTES NFR BLD: 3.56 K/UL (ref 1.5–4)
LYMPHOCYTES RELATIVE PERCENT: 23 % (ref 20–42)
MCH RBC QN AUTO: 31.1 PG (ref 26–35)
MCHC RBC AUTO-ENTMCNC: 37.1 G/DL (ref 32–34.5)
MCV RBC AUTO: 83.9 FL (ref 80–99.9)
MONOCYTES NFR BLD: 1.16 K/UL (ref 0.1–0.95)
MONOCYTES NFR BLD: 8 % (ref 2–12)
NEUTROPHILS NFR BLD: 67 % (ref 43–80)
NEUTS SEG NFR BLD: 10.45 K/UL (ref 1.8–7.3)
PLATELET # BLD AUTO: 224 K/UL (ref 130–450)
PMV BLD AUTO: 9.4 FL (ref 7–12)
RBC # BLD AUTO: 3.73 M/UL (ref 3.5–5.5)
RUBV IGG SERPL QL IA: 30.2 IU/ML
T GONDII IGG SER-ACNC: <0.5 IU/ML
WBC OTHER # BLD: 15.6 K/UL (ref 4.5–11.5)

## 2024-07-12 PROCEDURE — 85025 COMPLETE CBC W/AUTO DIFF WBC: CPT

## 2024-07-12 PROCEDURE — 6370000000 HC RX 637 (ALT 250 FOR IP): Performed by: MIDWIFE

## 2024-07-12 PROCEDURE — 99024 POSTOP FOLLOW-UP VISIT: CPT | Performed by: NURSE PRACTITIONER

## 2024-07-12 PROCEDURE — 6370000000 HC RX 637 (ALT 250 FOR IP): Performed by: OBSTETRICS & GYNECOLOGY

## 2024-07-12 RX ORDER — IBUPROFEN 800 MG/1
800 TABLET ORAL EVERY 8 HOURS SCHEDULED
Qty: 120 TABLET | Refills: 3 | Status: SHIPPED | OUTPATIENT
Start: 2024-07-12

## 2024-07-12 RX ADMIN — DOCUSATE SODIUM 100 MG: 100 CAPSULE, LIQUID FILLED ORAL at 09:05

## 2024-07-12 RX ADMIN — FERROUS SULFATE TAB 325 MG (65 MG ELEMENTAL FE) 325 MG: 325 (65 FE) TAB at 09:05

## 2024-07-12 RX ADMIN — ACETAMINOPHEN 1000 MG: 500 TABLET ORAL at 09:04

## 2024-07-12 RX ADMIN — FERROUS SULFATE TAB 325 MG (65 MG ELEMENTAL FE) 325 MG: 325 (65 FE) TAB at 09:04

## 2024-07-12 ASSESSMENT — PAIN SCALES - GENERAL: PAINLEVEL_OUTOF10: 3

## 2024-07-12 ASSESSMENT — PAIN DESCRIPTION - DESCRIPTORS: DESCRIPTORS: ACHING;DISCOMFORT

## 2024-07-12 ASSESSMENT — PAIN DESCRIPTION - LOCATION: LOCATION: PERINEUM

## 2024-07-12 NOTE — DISCHARGE SUMMARY
Obstetric Discharge Summary    Patient Name:  Keagan Rivera    Medical Record Number:  [id number]    Attending:  DAVION Sorto CNP    Date of Admission:  2024    Date of Discharge:        Admitting Diagnosis  IUP 38.5 weeks normal vaginal delivery  OB History          1    Para   1    Term   1            AB        Living   1         SAB        IAB        Ectopic        Molar        Multiple   0    Live Births   1                Reasons for Admission on 2024 10:11 AM  38 weeks gestation of pregnancy [Z3A.38]  No comment available  Induction of Labor    Intrapartum Procedures   Spontaneous Vaginal Delivery: See Labor and Delivery Summary       Postpartum/Operative Complications       [place] Data  Information for the patient's :  Robbie Rivera [[id number]]   female   Birth Weight: 3.08 kg (6 lb 12.6 oz)   Discharge With Mother  Complications: No    Discharge Diagnosis-normal vaginal delivery       Discharge Labs:  n/a    Discharge Meds:       Medication List        START taking these medications      ibuprofen 800 MG tablet  Commonly known as: ADVIL;MOTRIN  Take 1 tablet by mouth every 8 hours            CONTINUE taking these medications      Prenatal Vitamins 28-0.8 MG Tabs  Take 1 tablet by mouth daily               Where to Get Your Medications        [id number] - [place], [place] - [address] - P [phone] - F [phone]  [facility] [address]      Phone: [phone]   ibuprofen 800 MG tablet         Discharge Information  Current Discharge Medication List        START taking these medications    Details   ibuprofen (ADVIL;MOTRIN) 800 MG tablet Take 1 tablet by mouth every 8 hours  Qty: 120 tablet, Refills: 3           CONTINUE these medications which have NOT CHANGED    Details   Prenatal Vit-Fe Fumarate-FA (PRENATAL VITAMINS) 28-0.8 MG TABS Take 1 tablet by mouth daily  Qty: 30 tablet,  Refills: 12               Discharge to: Home  Follow up in 6 weeks at [facility].     Discharge Date: 7/12/24 Time: 8:43 AM    DAVION Sorto - SAMAN        Comments

## 2024-07-12 NOTE — PLAN OF CARE
Problem: Pain  Goal: Verbalizes/displays adequate comfort level or baseline comfort level  Outcome: Progressing     Problem: Postpartum  Goal: Experiences normal postpartum course  Description:  Postpartum OB-Pregnancy care plan goal which identifies if the mother is experiencing a normal postpartum course  2024 234 by Candelario Mcnamara, RN  Outcome: Progressing     Problem: Postpartum  Goal: Appropriate maternal -  bonding  Description:  Postpartum OB-Pregnancy care plan goal which identifies if the mother and  are bonding appropriately  2024 by Candelario Mcnamara, RN  Outcome: Progressing      Carrie   from [facility] is calling patient will be discharged on 09/26/2019 . I did ask her to fax over discharged summary. Phone # [phone] fax# [phone]

## 2024-07-12 NOTE — PROGRESS NOTES
PPD #2     Patient:  Keagan Rivera     Admit Date:  7/9/2024 10:11 AM  Medical Record Number:  [id number]   Today's Date: 7/12/2024    S: No complaints; tolerating diet: affirms ; ambulating well: affirms; voiding without difficulty:  affirms; bm: affirms; flatus: affirms; pain meds appropriate: affirms; vaginal bleeding: thin lochia.    O:   Vitals:    07/11/24 0812 07/11/24 1755 07/11/24 2336 07/12/24 0739   BP: 114/71 103/61 108/61 107/60   Pulse: 85 72 65 61   Resp: 18 16 16 16   Temp: 97.9 °F (36.6 °C) 98 °F (36.7 °C) 98 °F (36.7 °C) 97.8 °F (36.6 °C)   TempSrc: Oral Temporal Temporal Temporal   SpO2: 98% 97% 99% 99%   Weight:       Height:         Gen: A&O, cooperative, pleasant   Heart: RRR   Lungs: CTA b/l   Abd; soft, NT, non distended, +BS  Back: no CVA or paraspinal tenderness   Ext: No clubbing, cyanosis, edema:no , no cords palpable, no calf tenderness   Neuro: intact   Uterus: firm, well contracted, nt   Perineum: intact, healing well   Nina pad: thin lochia    Lab Results   Component Value Date    HGB 11.6 07/12/2024    HCT 31.3 (L) 07/12/2024      Recent Results (from the past 72 hour(s))   Drug Screen Multi Urine With Bup    Collection Time: 07/09/24 12:30 PM   Result Value Ref Range    Amphetamine Screen, Ur NEGATIVE NEGATIVE    Barbiturate Screen, Ur NEGATIVE NEGATIVE    Cocaine Metabolite, Urine NEGATIVE NEGATIVE    Benzodiazepine Screen, Urine NEGATIVE NEGATIVE    Buprenorphine Urine NEGATIVE NEGATIVE    Methadone Screen, Urine NEGATIVE NEGATIVE    Opiates, Urine NEGATIVE NEGATIVE    Phencyclidine, Urine NEGATIVE NEGATIVE    Cannabinoid Scrn, Ur POSITIVE (A) NEGATIVE    Fentanyl, Ur NEGATIVE NEGATIVE    Oxycodone Screen, Ur NEGATIVE NEGATIVE    Test Information       These drug screen results are for medical purposes only and should not be considered definitive or confirmed.   CBC    Collection Time: 07/09/24 12:30 PM   Result Value Ref Range    WBC 13.3 (H) 4.5 - 11.5 k/uL    RBC 4.43 3.50 -

## 2024-07-12 NOTE — DISCHARGE INSTRUCTIONS
Follow-up with your OB doctor in 1 week if  delivery or in  6 weeks for vaginal delivery unless otherwise instructed.   Call office for an appointment.    For breastfeeding support, you can contact our lactation specialists at [phone] or [phone]    DIET  Eat a well balanced diet focusing on foods high in fiber and protein  Drink plenty of fluids especially water.  To avoid constipation you may take a mild stool softener as recommended by your doctor or midwife.    ACTIVITY  Gradually increase your activity.  Resume exercise regimen only after advised by your doctor or midwife.  Avoid lifting anything heavier than your baby or a gallon of milk for SIX weeks.   Avoid driving until your doctor or midwife has given their approval.  Rise slowly from a lying to sitting and then a standing position.  Climb stairs one at a time.  Use caution when carrying your baby up and down the stairs.  No sexual activity for 6 weeks or until advised by your doctor - Nothing in vagina: intercourse, tampons, or douching.   Be prepared to discuss family planning at your follow-up OB visit.   You may feel tired or have a lack of energy.  You may continue your prenatal vitamin to replenish nutrients post delivery.  Nap when baby naps to catch up on sleep.  May return to work or school in 6 weeks or as directed by OB.     EMOTIONS  You may feed lopez, sad, teary, & overwhelmed.  Contact your OB provider if you feel you may be showing signs of postpartum depression, or have thoughts of harming yourself or your infant.  If infant will not stop crying, contact another adult for help or place infant in their crib on their back and take a break.  NEVER shake your infant.      BLEEDING  Vaginal bleeding will decrease in amount over the next few weeks.  You will notice that as your activity increases, your flow may increase.  This is your body's way of telling you, you need to take things easier and rest more often.  Call your

## 2024-07-17 NOTE — CARE COORDINATION
Social Work Discharge Plan     Baby's cord stat was tested for all tested substances. Baby tested negative for all tested substances.     Electronically signed by GRSIELDA Rene on 7/17/2024 at 11:20 AM

## 2024-07-25 ENCOUNTER — TELEPHONE (OUTPATIENT)
Dept: OBGYN | Age: 19
End: 2024-07-25

## 2024-07-25 NOTE — TELEPHONE ENCOUNTER
Patient phoned in with c/o foul vaginal odor,  Patient reports odor started 5 days ago and has worsened.  She stated \"it smells like someone is cooking something down there.  like pepe greens\" Patient had a  on 24.  States vaginal bleeding is a light pink to dark brown.  Patient advised to go to ER to be evaluated and she voiced understanding.  FLORINDA Ortiz notified.

## 2025-02-05 ENCOUNTER — HOSPITAL ENCOUNTER (EMERGENCY)
Age: 20
Discharge: HOME | End: 2025-02-05
Payer: COMMERCIAL

## 2025-02-05 VITALS
WEIGHT: 123 LBS | SYSTOLIC BLOOD PRESSURE: 116 MMHG | TEMPERATURE: 99 F | DIASTOLIC BLOOD PRESSURE: 57 MMHG | HEART RATE: 76 BPM | HEIGHT: 64 IN | OXYGEN SATURATION: 98 % | BODY MASS INDEX: 21 KG/M2 | RESPIRATION RATE: 16 BRPM

## 2025-02-05 DIAGNOSIS — N89.8 VAGINAL DISCHARGE: ICD-10-CM

## 2025-02-05 DIAGNOSIS — B96.89 BACTERIAL VAGINOSIS: ICD-10-CM

## 2025-02-05 DIAGNOSIS — Z20.2 POTENTIAL EXPOSURE TO STD: ICD-10-CM

## 2025-02-05 DIAGNOSIS — Z11.3 SCREEN FOR STD (SEXUALLY TRANSMITTED DISEASE): Primary | ICD-10-CM

## 2025-02-05 DIAGNOSIS — N76.0 BACTERIAL VAGINOSIS: ICD-10-CM

## 2025-02-05 LAB
BILIRUB UR QL STRIP: NEGATIVE
CLARITY UR: CLEAR
CLUE CELLS VAG QL WET PREP: ABNORMAL
COLOR UR: YELLOW
GLUCOSE UR STRIP-MCNC: NEGATIVE MG/DL
HCG, URINE, POC: NEGATIVE
HGB UR QL STRIP.AUTO: NEGATIVE
KETONES UR STRIP-MCNC: NEGATIVE MG/DL
LEUKOCYTE ESTERASE UR QL STRIP: NEGATIVE
Lab: NORMAL
NEGATIVE QC PASS/FAIL: NORMAL
NITRITE UR QL STRIP: NEGATIVE
PH UR STRIP: 6 [PH] (ref 5–8)
POSITIVE QC PASS/FAIL: NORMAL
PROT UR STRIP-MCNC: NEGATIVE MG/DL
RBC #/AREA URNS HPF: NORMAL /HPF
SOURCE WET PREP: ABNORMAL
SP GR UR STRIP: 1.02 (ref 1–1.03)
T VAGINALIS VAG QL WET PREP: ABNORMAL
UROBILINOGEN UR STRIP-ACNC: 0.2 EU/DL (ref 0–1)
WBC #/AREA URNS HPF: NORMAL /HPF
YEAST WET PREP: ABNORMAL

## 2025-02-05 PROCEDURE — 87491 CHLMYD TRACH DNA AMP PROBE: CPT

## 2025-02-05 PROCEDURE — 81001 URINALYSIS AUTO W/SCOPE: CPT

## 2025-02-05 PROCEDURE — 99283 EMERGENCY DEPT VISIT LOW MDM: CPT

## 2025-02-05 PROCEDURE — 87210 SMEAR WET MOUNT SALINE/INK: CPT

## 2025-02-05 PROCEDURE — 87591 N.GONORRHOEAE DNA AMP PROB: CPT

## 2025-02-05 PROCEDURE — 96372 THER/PROPH/DIAG INJ SC/IM: CPT

## 2025-02-05 PROCEDURE — 87086 URINE CULTURE/COLONY COUNT: CPT

## 2025-02-05 PROCEDURE — 6360000002 HC RX W HCPCS: Performed by: PHYSICIAN ASSISTANT

## 2025-02-05 PROCEDURE — 6370000000 HC RX 637 (ALT 250 FOR IP): Performed by: PHYSICIAN ASSISTANT

## 2025-02-05 RX ORDER — CEFTRIAXONE 500 MG/1
500 INJECTION, POWDER, FOR SOLUTION INTRAMUSCULAR; INTRAVENOUS ONCE
Status: COMPLETED | OUTPATIENT
Start: 2025-02-05 | End: 2025-02-05

## 2025-02-05 RX ORDER — WATER 10 ML/10ML
INJECTION INTRAMUSCULAR; INTRAVENOUS; SUBCUTANEOUS
Status: DISCONTINUED
Start: 2025-02-05 | End: 2025-02-05 | Stop reason: HOSPADM

## 2025-02-05 RX ORDER — METRONIDAZOLE 500 MG/1
500 TABLET ORAL 2 TIMES DAILY
Qty: 14 TABLET | Refills: 0 | Status: SHIPPED | OUTPATIENT
Start: 2025-02-05 | End: 2025-02-12

## 2025-02-05 RX ORDER — AZITHROMYCIN 250 MG/1
1000 TABLET, FILM COATED ORAL ONCE
Status: COMPLETED | OUTPATIENT
Start: 2025-02-05 | End: 2025-02-05

## 2025-02-05 RX ADMIN — CEFTRIAXONE SODIUM 500 MG: 500 INJECTION, POWDER, FOR SOLUTION INTRAMUSCULAR; INTRAVENOUS at 17:14

## 2025-02-05 RX ADMIN — AZITHROMYCIN 1000 MG: 250 TABLET, FILM COATED ORAL at 17:14

## 2025-02-05 NOTE — ED PROVIDER NOTES
Independent NAYA Visit.     Department of Emergency Medicine   ED  Provider Note  Admit Date/RoomTime: 2/5/2025  3:39 PM  ED Room: 31/31     Chief Complaint      Exposure to STD (Having yellow vaginal discharge, partner is having burning.  Wants checked for STD)    History of Present Illness      Keagan Rivera is a 19 y.o. old female who presents to the ED with concerns for STD.  Patient states her partner advised her that he was having some burning with urination.  Patient wants checked and treated for STDs.  She states she does have some yellow vaginal discharge but denies any irritation or rashes/lesions.  She has no abdominal pain, nausea, vomiting, urinary complaints, or back pain.  Patient has no fever/chills.  She is alert and oriented x 3 and in no apparent distress at this exam.  Patient is nontoxic-appearing.    PCP: No primary care provider on file.  OBGYN: Dr. Jose Juan STUART   Pertinent positives and negatives are stated within HPI, all other systems reviewed and are negative.    Past Medical History:  has a past medical history of Migraine.    Past Surgical History:  has no past surgical history on file.    Social History:  reports that she has been smoking cigarettes. She has never used smokeless tobacco. She reports that she does not currently use alcohol. She reports that she does not currently use drugs after having used the following drugs: Marijuana (Weed).    Family History: family history includes No Known Problems in her father and mother.     The patient’s home medications have been reviewed.    Allergies: Patient has no known allergies.  Allergies have been reviewed with patient.     Physical Exam   VS:  BP (!) 116/57   Pulse 76   Temp 99 °F (37.2 °C) (Oral)   Resp 16   Ht 1.613 m (5' 3.5\")   Wt 55.8 kg (123 lb)   SpO2 98%   BMI 21.45 kg/m²      Oxygen Saturation Interpretation: Normal.    General Appearance/Constitutional:  Alert and oriented x4, development consistent with age,

## 2025-02-07 LAB
C TRACH DNA SPEC QL PROBE+SIG AMP: ABNORMAL
MICROORGANISM SPEC CULT: ABNORMAL
N GONORRHOEA DNA SPEC QL PROBE+SIG AMP: NEGATIVE
SERVICE CMNT-IMP: ABNORMAL
SPECIMEN DESCRIPTION: ABNORMAL
SPECIMEN DESCRIPTION: ABNORMAL

## 2025-04-25 ENCOUNTER — HOSPITAL ENCOUNTER (EMERGENCY)
Age: 20
Discharge: HOME | End: 2025-04-25
Payer: COMMERCIAL

## 2025-04-25 ENCOUNTER — APPOINTMENT (OUTPATIENT)
Dept: ULTRASOUND IMAGING | Age: 20
End: 2025-04-25
Payer: COMMERCIAL

## 2025-04-25 ENCOUNTER — APPOINTMENT (OUTPATIENT)
Dept: CT IMAGING | Age: 20
End: 2025-04-25
Payer: COMMERCIAL

## 2025-04-25 VITALS
BODY MASS INDEX: 21 KG/M2 | WEIGHT: 123 LBS | RESPIRATION RATE: 16 BRPM | SYSTOLIC BLOOD PRESSURE: 112 MMHG | TEMPERATURE: 98.2 F | OXYGEN SATURATION: 100 % | HEIGHT: 64 IN | HEART RATE: 70 BPM | DIASTOLIC BLOOD PRESSURE: 84 MMHG

## 2025-04-25 DIAGNOSIS — N93.8 DUB (DYSFUNCTIONAL UTERINE BLEEDING): Primary | ICD-10-CM

## 2025-04-25 DIAGNOSIS — R10.9 ABDOMINAL PAIN, UNSPECIFIED ABDOMINAL LOCATION: ICD-10-CM

## 2025-04-25 LAB
ALBUMIN SERPL-MCNC: 4.6 G/DL (ref 3.5–5.2)
ALP SERPL-CCNC: 48 U/L (ref 35–104)
ALT SERPL-CCNC: 18 U/L (ref 0–32)
ANION GAP SERPL CALCULATED.3IONS-SCNC: 9 MMOL/L (ref 7–16)
AST SERPL-CCNC: 19 U/L (ref 0–31)
BASOPHILS # BLD: 0.07 K/UL (ref 0–0.2)
BASOPHILS NFR BLD: 1 % (ref 0–2)
BILIRUB SERPL-MCNC: 1.8 MG/DL (ref 0–1.2)
BILIRUB UR QL STRIP: NEGATIVE
BUN SERPL-MCNC: 8 MG/DL (ref 6–20)
C TRACH DNA SPEC QL PROBE+SIG AMP: NORMAL
CALCIUM SERPL-MCNC: 9.5 MG/DL (ref 8.6–10.2)
CHLORIDE SERPL-SCNC: 102 MMOL/L (ref 98–107)
CLARITY UR: CLEAR
CLUE CELLS VAG QL WET PREP: NORMAL
CO2 SERPL-SCNC: 27 MMOL/L (ref 22–29)
COLOR UR: YELLOW
CREAT SERPL-MCNC: 0.8 MG/DL (ref 0.5–1)
EOSINOPHIL # BLD: 0.18 K/UL (ref 0.05–0.5)
EOSINOPHILS RELATIVE PERCENT: 1 % (ref 0–6)
EPI CELLS #/AREA URNS HPF: ABNORMAL /HPF
ERYTHROCYTE [DISTWIDTH] IN BLOOD BY AUTOMATED COUNT: 15 % (ref 11.5–15)
GFR, ESTIMATED: >90 ML/MIN/1.73M2
GLUCOSE SERPL-MCNC: 103 MG/DL (ref 74–99)
GLUCOSE UR STRIP-MCNC: NEGATIVE MG/DL
HCG, URINE, POC: NEGATIVE
HCT VFR BLD AUTO: 36.9 % (ref 34–48)
HGB BLD-MCNC: 12.8 G/DL (ref 11.5–15.5)
HGB UR QL STRIP.AUTO: ABNORMAL
IMM GRANULOCYTES # BLD AUTO: 0.04 K/UL (ref 0–0.58)
IMM GRANULOCYTES NFR BLD: 0 % (ref 0–5)
KETONES UR STRIP-MCNC: NEGATIVE MG/DL
LEUKOCYTE ESTERASE UR QL STRIP: ABNORMAL
LIPASE SERPL-CCNC: 18 U/L (ref 13–60)
LYMPHOCYTES NFR BLD: 2.7 K/UL (ref 1.5–4)
LYMPHOCYTES RELATIVE PERCENT: 21 % (ref 20–42)
Lab: NORMAL
MCH RBC QN AUTO: 27.8 PG (ref 26–35)
MCHC RBC AUTO-ENTMCNC: 34.7 G/DL (ref 32–34.5)
MCV RBC AUTO: 80 FL (ref 80–99.9)
MONOCYTES NFR BLD: 0.71 K/UL (ref 0.1–0.95)
MONOCYTES NFR BLD: 6 % (ref 2–12)
N GONORRHOEA DNA SPEC QL PROBE+SIG AMP: NORMAL
NEGATIVE QC PASS/FAIL: NORMAL
NEUTROPHILS NFR BLD: 71 % (ref 43–80)
NEUTS SEG NFR BLD: 8.93 K/UL (ref 1.8–7.3)
NITRITE UR QL STRIP: NEGATIVE
PH UR STRIP: 7.5 [PH] (ref 5–8)
PLATELET # BLD AUTO: 362 K/UL (ref 130–450)
PMV BLD AUTO: 9.4 FL (ref 7–12)
POSITIVE QC PASS/FAIL: NORMAL
POTASSIUM SERPL-SCNC: 4.5 MMOL/L (ref 3.5–5)
PROT SERPL-MCNC: 7.6 G/DL (ref 6.4–8.3)
PROT UR STRIP-MCNC: NEGATIVE MG/DL
RBC # BLD AUTO: 4.61 M/UL (ref 3.5–5.5)
RBC #/AREA URNS HPF: ABNORMAL /HPF
SODIUM SERPL-SCNC: 138 MMOL/L (ref 132–146)
SOURCE WET PREP: NORMAL
SP GR UR STRIP: 1.01 (ref 1–1.03)
SPECIMEN DESCRIPTION: NORMAL
T VAGINALIS VAG QL WET PREP: NORMAL
UROBILINOGEN UR STRIP-ACNC: 1 EU/DL (ref 0–1)
WBC #/AREA URNS HPF: ABNORMAL /HPF
WBC OTHER # BLD: 12.6 K/UL (ref 4.5–11.5)
YEAST WET PREP: NORMAL

## 2025-04-25 PROCEDURE — 74177 CT ABD & PELVIS W/CONTRAST: CPT

## 2025-04-25 PROCEDURE — 81001 URINALYSIS AUTO W/SCOPE: CPT

## 2025-04-25 PROCEDURE — 87491 CHLMYD TRACH DNA AMP PROBE: CPT

## 2025-04-25 PROCEDURE — 80053 COMPREHEN METABOLIC PANEL: CPT

## 2025-04-25 PROCEDURE — 87210 SMEAR WET MOUNT SALINE/INK: CPT

## 2025-04-25 PROCEDURE — 87086 URINE CULTURE/COLONY COUNT: CPT

## 2025-04-25 PROCEDURE — 83690 ASSAY OF LIPASE: CPT

## 2025-04-25 PROCEDURE — 6360000004 HC RX CONTRAST MEDICATION: Performed by: RADIOLOGY

## 2025-04-25 PROCEDURE — 76830 TRANSVAGINAL US NON-OB: CPT

## 2025-04-25 PROCEDURE — 87591 N.GONORRHOEAE DNA AMP PROB: CPT

## 2025-04-25 PROCEDURE — 85025 COMPLETE CBC W/AUTO DIFF WBC: CPT

## 2025-04-25 PROCEDURE — 99285 EMERGENCY DEPT VISIT HI MDM: CPT

## 2025-04-25 RX ORDER — IOPAMIDOL 755 MG/ML
75 INJECTION, SOLUTION INTRAVASCULAR
Status: COMPLETED | OUTPATIENT
Start: 2025-04-25 | End: 2025-04-25

## 2025-04-25 RX ADMIN — IOPAMIDOL 75 ML: 755 INJECTION, SOLUTION INTRAVENOUS at 18:28

## 2025-04-25 ASSESSMENT — PAIN DESCRIPTION - ONSET: ONSET: ON-GOING

## 2025-04-25 ASSESSMENT — PAIN DESCRIPTION - FREQUENCY: FREQUENCY: CONTINUOUS

## 2025-04-25 ASSESSMENT — PAIN SCALES - GENERAL: PAINLEVEL_OUTOF10: 2

## 2025-04-25 ASSESSMENT — PAIN DESCRIPTION - DESCRIPTORS: DESCRIPTORS: CRAMPING

## 2025-04-25 ASSESSMENT — PAIN DESCRIPTION - LOCATION: LOCATION: ABDOMEN

## 2025-04-25 ASSESSMENT — PAIN DESCRIPTION - PAIN TYPE: TYPE: ACUTE PAIN

## 2025-04-25 ASSESSMENT — PAIN - FUNCTIONAL ASSESSMENT: PAIN_FUNCTIONAL_ASSESSMENT: 0-10

## 2025-04-25 NOTE — ED PROVIDER NOTES
Independent NAYA Visit.       [facility]  Department of Emergency Medicine   ED  Encounter Note  Admit Date/RoomTime: 2025  3:48 PM  ED Room: VERONIKA/VERONIKA    NAME: Keagan Rivera  : 2005  MRN: [id number]     Chief Complaint:  Vaginal Bleeding (Light bleeding.  Abd cramping.  Finished cycle 2 weeks ago)    History of Present Illness       Keagan Rivera is a 19 y.o. old female who presents to the emergency department by private vehicle for abnormal bleeding, which occured a few hour(s) prior to arrival.  Since onset the symptoms have been remaining constant and mild in severity.  Symptoms are associated with lower abdominal pain and denies any back pain, chest pain, shortness of breath, fever, chills, sweating, nausea, vomiting, anorexia, weight loss, diarrhea, constipation, dark/black stools, blood in stool, blood in emesis, cloudy urine, urinary frequency, dysuria, hematuria, urinary urgency, or urinary incontinence. She takes no blood thinning agents.  Patient's last menstrual period was 04/10/2025.. .  GYN/STD Hx: Patient has a history of BV and chlamydia in the past.  She does not have an OB/GYN.  States that she finished her menstrual cycle 2 weeks ago..    ROS   Pertinent positives and negatives are stated within HPI, all other systems reviewed and are negative.    Past Medical History:  has a past medical history of Migraine.    Surgical History:  has no past surgical history on file.    Social History:  reports that she has been smoking cigarettes. She has never used smokeless tobacco. She reports that she does not currently use alcohol. She reports that she does not currently use drugs after having used the following drugs: Marijuana (Weed).    Family History: family history includes No Known Problems in her father and mother.     Allergies: Patient has no known allergies.    Physical Exam   Oxygen Saturation Interpretation: Normal.        ED Triage Vitals   BP Systolic BP  to the ER for patient states that she finished her menstrual cycle 2 weeks ago.  She states she restarted bleeding.  Denies any clots.  States is mild.  Having lower abdominal cramping/pain.  Recently treated for BV and chlamydia in February 2025.   The historian that provided information patient  Social Determinants include   Social Connections: Not on file    Social Determinants : None.   Chronic conditions that may affect care    Past Medical History:   Diagnosis Date    Migraine    .    Physical exam patient has lower abdominal tenderness upon palpitation.  Bowel sounds are throughout.  No CVA tenderness noted.  Small amount of blood in the vaginal vault with no clots.  Vital signs stable.      Differential diagnoses include but not limited to dysfunctional uterine bleeding versus hemorrhaging versus anemia.     Diagnostic studies including labs and imagining which was interpreted by radiologist reveals urinalysis had large amount of blood.  Small leukocytes with no white blood cells 0-5.  Epithelials 3-5.  Likely contaminated.  CBC reveals a white count of 12.6 and a hemoglobin of 12.8.  CMP was unremarkable electrolyte balance normal kidney and liver function.  Lipase was normal.  Urine pregnancy is negative.  Wet prep was negative for trichomonas, yeast and clue cells.  GC cultures pending at this time.  CT of the abdomen reveals normal appendix.  Ultrasound reveals Hydrance appearance of the uterus with severe echogenic subendometrial implants.  Likely underlying adenomyosis.  No appearance of the endometrium.  Normal appearance of bilateral ovaries.  Normal Doppler flow of the ovaries.  Small amount of simple fluid in the cul-de-sac.    Consults included none.   Results were discussed with patient.    Patient will be discharged home with the following prescriptions, none.    Discussed appropriate use and potential side effects of starting the prescribed medications.   Patient continues to be non-toxic on

## 2025-04-25 NOTE — DISCHARGE INSTRUCTIONS
US NON OB TRANSVAGINAL   Final Result   1.  Heterogeneous appearing uterus with several echogenic subendometrial   implants.  Likely reflects underlying adenomyosis.  Normal appearance of the   endometrium.      2.  Normal appearance of the bilateral ovaries.  There are no adnexal masses.   Normal Doppler flow within the ovaries.      3.  Small amount of simple appearing free fluid in the cul-de-sac.  This is   presumed to be physiologic.      RECOMMENDATIONS:   Continued clinical follow-up suggested.  Repeat imaging evaluation may be   warranted if patient's symptoms continue to worsen.         CT ABDOMEN PELVIS W IV CONTRAST Additional Contrast? None    (Results Pending)

## 2025-04-27 ENCOUNTER — RESULTS FOLLOW-UP (OUTPATIENT)
Dept: EMERGENCY DEPT | Age: 20
End: 2025-04-27

## 2025-04-27 LAB
MICROORGANISM SPEC CULT: ABNORMAL
MICROORGANISM SPEC CULT: ABNORMAL
SERVICE CMNT-IMP: ABNORMAL
SPECIMEN DESCRIPTION: ABNORMAL

## 2025-04-29 LAB
C TRACH DNA SPEC QL PROBE+SIG AMP: NEGATIVE
N GONORRHOEA DNA SPEC QL PROBE+SIG AMP: NEGATIVE
SPECIMEN DESCRIPTION: NORMAL

## 2025-05-22 ENCOUNTER — TELEPHONE (OUTPATIENT)
Dept: ADMINISTRATIVE | Age: 20
End: 2025-05-22

## 2025-06-08 ENCOUNTER — HOSPITAL ENCOUNTER (EMERGENCY)
Age: 20
Discharge: HOME | End: 2025-06-08
Payer: COMMERCIAL

## 2025-06-08 ENCOUNTER — APPOINTMENT (OUTPATIENT)
Dept: ULTRASOUND IMAGING | Age: 20
End: 2025-06-08
Payer: COMMERCIAL

## 2025-06-08 VITALS
TEMPERATURE: 98.1 F | WEIGHT: 121 LBS | HEIGHT: 63 IN | OXYGEN SATURATION: 97 % | BODY MASS INDEX: 21.44 KG/M2 | DIASTOLIC BLOOD PRESSURE: 68 MMHG | RESPIRATION RATE: 16 BRPM | HEART RATE: 70 BPM | SYSTOLIC BLOOD PRESSURE: 110 MMHG

## 2025-06-08 DIAGNOSIS — O46.90 VAGINAL BLEEDING IN PREGNANCY: Primary | ICD-10-CM

## 2025-06-08 DIAGNOSIS — O99.891 ASYMPTOMATIC BACTERIURIA IN PREGNANCY IN FIRST TRIMESTER: ICD-10-CM

## 2025-06-08 DIAGNOSIS — O20.0 THREATENED MISCARRIAGE IN EARLY PREGNANCY: ICD-10-CM

## 2025-06-08 DIAGNOSIS — R82.71 ASYMPTOMATIC BACTERIURIA IN PREGNANCY IN FIRST TRIMESTER: ICD-10-CM

## 2025-06-08 LAB
ALBUMIN SERPL-MCNC: 4.4 G/DL (ref 3.5–5.2)
ALP SERPL-CCNC: 42 U/L (ref 35–104)
ALT SERPL-CCNC: 11 U/L (ref 0–32)
ANION GAP SERPL CALCULATED.3IONS-SCNC: 16 MMOL/L (ref 7–16)
AST SERPL-CCNC: 17 U/L (ref 0–31)
B-HCG SERPL EIA 3RD IS-ACNC: 6146 MIU/ML (ref 0–7)
BACTERIA URNS QL MICRO: ABNORMAL
BASOPHILS # BLD: 0.04 K/UL (ref 0–0.2)
BASOPHILS NFR BLD: 0 % (ref 0–2)
BILIRUB SERPL-MCNC: 1.8 MG/DL (ref 0–1.2)
BILIRUB UR QL STRIP: NEGATIVE
BUN SERPL-MCNC: 9 MG/DL (ref 6–20)
CALCIUM SERPL-MCNC: 9.2 MG/DL (ref 8.6–10.2)
CHLORIDE SERPL-SCNC: 101 MMOL/L (ref 98–107)
CLARITY UR: CLEAR
CO2 SERPL-SCNC: 22 MMOL/L (ref 22–29)
COLOR UR: YELLOW
CREAT SERPL-MCNC: 0.9 MG/DL (ref 0.5–1)
EOSINOPHIL # BLD: 0.17 K/UL (ref 0.05–0.5)
EOSINOPHILS RELATIVE PERCENT: 1 % (ref 0–6)
EPI CELLS #/AREA URNS HPF: ABNORMAL /HPF
ERYTHROCYTE [DISTWIDTH] IN BLOOD BY AUTOMATED COUNT: 16.5 % (ref 11.5–15)
GFR, ESTIMATED: >90 ML/MIN/1.73M2
GLUCOSE SERPL-MCNC: 93 MG/DL (ref 74–99)
GLUCOSE UR STRIP-MCNC: NEGATIVE MG/DL
HCT VFR BLD AUTO: 35.8 % (ref 34–48)
HGB BLD-MCNC: 12.9 G/DL (ref 11.5–15.5)
HGB UR QL STRIP.AUTO: ABNORMAL
IMM GRANULOCYTES # BLD AUTO: 0.04 K/UL (ref 0–0.58)
IMM GRANULOCYTES NFR BLD: 0 % (ref 0–5)
KETONES UR STRIP-MCNC: ABNORMAL MG/DL
LEUKOCYTE ESTERASE UR QL STRIP: NEGATIVE
LYMPHOCYTES NFR BLD: 2.36 K/UL (ref 1.5–4)
LYMPHOCYTES RELATIVE PERCENT: 20 % (ref 20–42)
MCH RBC QN AUTO: 28 PG (ref 26–35)
MCHC RBC AUTO-ENTMCNC: 36 G/DL (ref 32–34.5)
MCV RBC AUTO: 77.8 FL (ref 80–99.9)
MONOCYTES NFR BLD: 0.74 K/UL (ref 0.1–0.95)
MONOCYTES NFR BLD: 6 % (ref 2–12)
NEUTROPHILS NFR BLD: 72 % (ref 43–80)
NEUTS SEG NFR BLD: 8.43 K/UL (ref 1.8–7.3)
NITRITE UR QL STRIP: NEGATIVE
PH UR STRIP: 6.5 [PH] (ref 5–8)
PLATELET # BLD AUTO: 345 K/UL (ref 130–450)
PMV BLD AUTO: 9.5 FL (ref 7–12)
POTASSIUM SERPL-SCNC: 4.2 MMOL/L (ref 3.5–5)
PROT SERPL-MCNC: 7.5 G/DL (ref 6.4–8.3)
PROT UR STRIP-MCNC: ABNORMAL MG/DL
RBC # BLD AUTO: 4.6 M/UL (ref 3.5–5.5)
RBC #/AREA URNS HPF: ABNORMAL /HPF
SODIUM SERPL-SCNC: 139 MMOL/L (ref 132–146)
SP GR UR STRIP: 1.02 (ref 1–1.03)
UROBILINOGEN UR STRIP-ACNC: 2 EU/DL (ref 0–1)
WBC #/AREA URNS HPF: ABNORMAL /HPF
WBC OTHER # BLD: 11.8 K/UL (ref 4.5–11.5)

## 2025-06-08 PROCEDURE — 76817 TRANSVAGINAL US OBSTETRIC: CPT

## 2025-06-08 PROCEDURE — 81001 URINALYSIS AUTO W/SCOPE: CPT

## 2025-06-08 PROCEDURE — 85025 COMPLETE CBC W/AUTO DIFF WBC: CPT

## 2025-06-08 PROCEDURE — 87086 URINE CULTURE/COLONY COUNT: CPT

## 2025-06-08 PROCEDURE — 80053 COMPREHEN METABOLIC PANEL: CPT

## 2025-06-08 PROCEDURE — 99284 EMERGENCY DEPT VISIT MOD MDM: CPT

## 2025-06-08 PROCEDURE — 84702 CHORIONIC GONADOTROPIN TEST: CPT

## 2025-06-08 RX ORDER — CEPHALEXIN 500 MG/1
500 CAPSULE ORAL 4 TIMES DAILY
Qty: 20 CAPSULE | Refills: 0 | Status: SHIPPED | OUTPATIENT
Start: 2025-06-08 | End: 2025-06-13

## 2025-06-08 ASSESSMENT — LIFESTYLE VARIABLES
HOW MANY STANDARD DRINKS CONTAINING ALCOHOL DO YOU HAVE ON A TYPICAL DAY: 1 OR 2
HOW OFTEN DO YOU HAVE A DRINK CONTAINING ALCOHOL: MONTHLY OR LESS

## 2025-06-08 NOTE — ED PROVIDER NOTES
miscarriage in early pregnancy    3. Asymptomatic bacteriuria in pregnancy in first trimester      Plan   Discharged home.  Patient condition is stable    New Medications     Discharge Medication List as of 6/8/2025  6:23 PM        START taking these medications    Details   cephALEXin (KEFLEX) 500 MG capsule Take 1 capsule by mouth 4 times daily for 5 days, Disp-20 capsule, R-0Normal           Electronically signed by DAVION Blood CNP   DD: 6/8/25  **This report was transcribed using voice recognition software. Every effort was made to ensure accuracy; however, inadvertent computerized transcription errors may be present.  END OF ED PROVIDER NOTE       Chirag Allen APRN - CNP  06/08/25 4103

## 2025-06-08 NOTE — DISCHARGE INSTR - COC
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    Iron deficiency anemia, unspecified D50.9    Elevated liver enzymes R74.8    Positive urine drug screen R82.5    38 weeks gestation of pregnancy Z3A.38       Isolation/Infection:   Isolation            No Isolation          Patient Infection Status    None to display         Nurse Assessment:  Last Vital Signs: /68   Pulse 70   Temp 98.1 °F (36.7 °C)   Resp 16   Ht 1.6 m (5' 3\")   Wt 54.9 kg (121 lb)   SpO2 97%   BMI 21.43 kg/m²     Last documented pain score (0-10 scale):    Last Weight:   Wt Readings from Last 1 Encounters:   06/08/25 54.9 kg (121 lb)     Mental Status:  {IP PT MENTAL STATUS:20030}    IV Access:  { PEARL IV ACCESS:846926632}    Nursing Mobility/ADLs:  Walking   {CHP DME ADLs:762337264}  Transfer  {CHP DME ADLs:879702433}  Bathing  {CHP DME ADLs:138999409}  Dressing  {CHP DME ADLs:113994627}  Toileting  {CHP DME ADLs:151095668}  Feeding  {CHP DME ADLs:442789023}  Med Admin  {P DME ADLs:455330864}  Med Delivery   { PEARL MED Delivery:235928763}    Wound Care Documentation and Therapy:        Elimination:  Continence:   Bowel: {YES / NO:19727}  Bladder: {YES / NO:19727}  Urinary Catheter: {Urinary Catheter:028284661}   Colostomy/Ileostomy/Ileal Conduit: {YES / NO:19727}       Date of Last BM: ***  No intake or output data in the 24 hours ending 06/08/25 1941  No intake/output data recorded.    Safety Concerns:     { PEARL Safety Concerns:500903193}    Impairments/Disabilities:      { PEARL Impairments/Disabilities:164964309}    Nutrition Therapy:  Current Nutrition Therapy:   { PEARL Diet List:532676499}    Routes of Feeding: {CHP DME Other Feedings:831053539}  Liquids: {Slp liquid thickness:92859}  Daily Fluid Restriction: {CHP DME Yes amt example:985593629}  Last Modified Barium Swallow with Video (Video Swallowing Test): {Done Not Done Date:828328868}    Treatments at the Time of

## 2025-06-08 NOTE — DISCHARGE INSTRUCTIONS
US OB TRANSVAGINAL   Final Result      1.  Single IUP with best estimate of gestational age being 6 weeks and 1 day.   No abnormal fluid collections about the gestation. Yolk sac, fetal pole and   fetal cardiac motion noted, with fetal heart rate of 119 BPM.      2.  Otherwise no acute pathology noted and no evidence of ovarian torsion.           Continue taking prenatal vitamin daily   Pelvic rest until cleared by OBGYN

## 2025-06-09 ENCOUNTER — RESULTS FOLLOW-UP (OUTPATIENT)
Dept: EMERGENCY DEPT | Age: 20
End: 2025-06-09

## 2025-06-13 ENCOUNTER — HOSPITAL ENCOUNTER (EMERGENCY)
Age: 20
Discharge: LEFT BEFORE DISCHARGE | End: 2025-06-13
Payer: COMMERCIAL

## 2025-06-13 VITALS
HEART RATE: 88 BPM | DIASTOLIC BLOOD PRESSURE: 72 MMHG | SYSTOLIC BLOOD PRESSURE: 113 MMHG | RESPIRATION RATE: 18 BRPM | TEMPERATURE: 98.1 F | OXYGEN SATURATION: 99 %

## 2025-06-13 PROCEDURE — 99281 EMR DPT VST MAYX REQ PHY/QHP: CPT

## 2025-06-13 NOTE — ED TRIAGE NOTES
FIRST PROVIDER CONTACT ASSESSMENT NOTE       Department of Emergency Medicine                 First Provider Note            25  4:09 PM EDT    Date of Encounter: No admission date for patient encounter.    Patient Name: Keagan Rivera  : 2005  MRN: [id number]    Chief Complaint: Vaginal Bleeding (was here last week for the same and discharged with abx)      History of Present Illness:   Keagan Rivera is a 20 y.o. female who presents to the ED for vaginal bleeding during early pregnancy.  Was seen last week for same.   Measured 6 weeks 1 day last time.   Good FHT\"s.       Focused Physical Exam:  VS:    ED Triage Vitals [25 1606]   BP Systolic BP Percentile Diastolic BP Percentile Temp Temp src Pulse Respirations SpO2   113/72 -- -- 98.1 °F (36.7 °C) -- 88 18 99 %      Height Weight         -- --              Physical Ex: Constitutional: Alert and non-toxic.    Medical History:  has a past medical history of Migraine.  Surgical History:  has no past surgical history on file.  Social History:  reports that she has been smoking cigarettes. She has never used smokeless tobacco. She reports that she does not currently use alcohol. She reports current drug use. Drug: Marijuana (Weed).  Family History: family history includes No Known Problems in her father and mother.    Allergies: Patient has no known allergies.     Initial Plan of Care: Initiate Treatment-Testing, Proceed toTreatment Area When Bed Available for ED Attending/MLP to Continue Care      ---END OF FIRST PROVIDER CONTACT ASSESSMENT NOTE---  Electronically signed by Angela Melo PA-C   DD: 25

## 2025-06-14 ENCOUNTER — APPOINTMENT (OUTPATIENT)
Dept: ULTRASOUND IMAGING | Age: 20
End: 2025-06-14
Payer: COMMERCIAL

## 2025-06-14 ENCOUNTER — HOSPITAL ENCOUNTER (EMERGENCY)
Age: 20
Discharge: HOME | End: 2025-06-14
Attending: EMERGENCY MEDICINE
Payer: COMMERCIAL

## 2025-06-14 VITALS
DIASTOLIC BLOOD PRESSURE: 64 MMHG | HEART RATE: 60 BPM | HEIGHT: 63 IN | OXYGEN SATURATION: 99 % | WEIGHT: 121 LBS | SYSTOLIC BLOOD PRESSURE: 105 MMHG | RESPIRATION RATE: 15 BRPM | TEMPERATURE: 98.4 F | BODY MASS INDEX: 21.44 KG/M2

## 2025-06-14 DIAGNOSIS — O46.90 VAGINAL BLEEDING IN PREGNANCY: Primary | ICD-10-CM

## 2025-06-14 DIAGNOSIS — O03.9 MISCARRIAGE: ICD-10-CM

## 2025-06-14 LAB
ALBUMIN SERPL-MCNC: 4.3 G/DL (ref 3.5–5.2)
ALP SERPL-CCNC: 40 U/L (ref 35–104)
ALT SERPL-CCNC: 10 U/L (ref 0–32)
ANION GAP SERPL CALCULATED.3IONS-SCNC: 12 MMOL/L (ref 7–16)
AST SERPL-CCNC: 16 U/L (ref 0–31)
B-HCG SERPL EIA 3RD IS-ACNC: 401.6 MIU/ML (ref 0–7)
BASOPHILS # BLD: 0.05 K/UL (ref 0–0.2)
BASOPHILS NFR BLD: 1 % (ref 0–2)
BILIRUB SERPL-MCNC: 0.8 MG/DL (ref 0–1.2)
BILIRUB UR QL STRIP: ABNORMAL
BUN SERPL-MCNC: 9 MG/DL (ref 6–20)
CALCIUM SERPL-MCNC: 9.3 MG/DL (ref 8.6–10.2)
CHLORIDE SERPL-SCNC: 104 MMOL/L (ref 98–107)
CLARITY UR: CLEAR
CO2 SERPL-SCNC: 24 MMOL/L (ref 22–29)
COLOR UR: YELLOW
CREAT SERPL-MCNC: 0.8 MG/DL (ref 0.5–1)
EOSINOPHIL # BLD: 0.18 K/UL (ref 0.05–0.5)
EOSINOPHILS RELATIVE PERCENT: 2 % (ref 0–6)
ERYTHROCYTE [DISTWIDTH] IN BLOOD BY AUTOMATED COUNT: 16.4 % (ref 11.5–15)
GFR, ESTIMATED: >90 ML/MIN/1.73M2
GLUCOSE SERPL-MCNC: 97 MG/DL (ref 74–99)
GLUCOSE UR STRIP-MCNC: NEGATIVE MG/DL
HCT VFR BLD AUTO: 35.2 % (ref 34–48)
HGB BLD-MCNC: 12.8 G/DL (ref 11.5–15.5)
HGB UR QL STRIP.AUTO: ABNORMAL
IMM GRANULOCYTES # BLD AUTO: <0.03 K/UL (ref 0–0.58)
IMM GRANULOCYTES NFR BLD: 0 % (ref 0–5)
KETONES UR STRIP-MCNC: NEGATIVE MG/DL
LEUKOCYTE ESTERASE UR QL STRIP: NEGATIVE
LYMPHOCYTES NFR BLD: 2.47 K/UL (ref 1.5–4)
LYMPHOCYTES RELATIVE PERCENT: 28 % (ref 20–42)
MCH RBC QN AUTO: 28.4 PG (ref 26–35)
MCHC RBC AUTO-ENTMCNC: 36.4 G/DL (ref 32–34.5)
MCV RBC AUTO: 78 FL (ref 80–99.9)
MONOCYTES NFR BLD: 0.65 K/UL (ref 0.1–0.95)
MONOCYTES NFR BLD: 7 % (ref 2–12)
NEUTROPHILS NFR BLD: 61 % (ref 43–80)
NEUTS SEG NFR BLD: 5.36 K/UL (ref 1.8–7.3)
NITRITE UR QL STRIP: NEGATIVE
PH UR STRIP: 6 [PH] (ref 5–8)
PLATELET # BLD AUTO: 342 K/UL (ref 130–450)
PMV BLD AUTO: 9.3 FL (ref 7–12)
POTASSIUM SERPL-SCNC: 3.7 MMOL/L (ref 3.5–5)
PROT SERPL-MCNC: 7.6 G/DL (ref 6.4–8.3)
PROT UR STRIP-MCNC: ABNORMAL MG/DL
RBC # BLD AUTO: 4.51 M/UL (ref 3.5–5.5)
RBC #/AREA URNS HPF: NORMAL /HPF
SODIUM SERPL-SCNC: 140 MMOL/L (ref 132–146)
SP GR UR STRIP: >1.03 (ref 1–1.03)
UROBILINOGEN UR STRIP-ACNC: 0.2 EU/DL (ref 0–1)
WBC #/AREA URNS HPF: NORMAL /HPF
WBC OTHER # BLD: 8.7 K/UL (ref 4.5–11.5)

## 2025-06-14 PROCEDURE — 85025 COMPLETE CBC W/AUTO DIFF WBC: CPT

## 2025-06-14 PROCEDURE — 80053 COMPREHEN METABOLIC PANEL: CPT

## 2025-06-14 PROCEDURE — 76817 TRANSVAGINAL US OBSTETRIC: CPT

## 2025-06-14 PROCEDURE — 81001 URINALYSIS AUTO W/SCOPE: CPT

## 2025-06-14 PROCEDURE — 99284 EMERGENCY DEPT VISIT MOD MDM: CPT

## 2025-06-14 PROCEDURE — 84702 CHORIONIC GONADOTROPIN TEST: CPT

## 2025-06-14 ASSESSMENT — PAIN DESCRIPTION - LOCATION: LOCATION: BACK;ABDOMEN

## 2025-06-14 ASSESSMENT — LIFESTYLE VARIABLES
HOW OFTEN DO YOU HAVE A DRINK CONTAINING ALCOHOL: MONTHLY OR LESS
HOW MANY STANDARD DRINKS CONTAINING ALCOHOL DO YOU HAVE ON A TYPICAL DAY: 1 OR 2

## 2025-06-14 ASSESSMENT — PAIN SCALES - GENERAL: PAINLEVEL_OUTOF10: 5

## 2025-06-14 ASSESSMENT — PAIN DESCRIPTION - ORIENTATION: ORIENTATION: LOWER

## 2025-06-14 ASSESSMENT — PAIN - FUNCTIONAL ASSESSMENT: PAIN_FUNCTIONAL_ASSESSMENT: 0-10

## 2025-06-14 NOTE — ED PROVIDER NOTES
Affect            DIAGNOSTIC RESULTS   LABS:      I have personally reviewed and interpreted all laboratory and imaging results for this patient. Results are listed below.     LABS:  Results for orders placed or performed during the hospital encounter of 06/14/25   CBC with Auto Differential   Result Value Ref Range    WBC 8.7 4.5 - 11.5 k/uL    RBC 4.51 3.50 - 5.50 m/uL    Hemoglobin 12.8 11.5 - 15.5 g/dL    Hematocrit 35.2 34.0 - 48.0 %    MCV 78.0 (L) 80.0 - 99.9 fL    MCH 28.4 26.0 - 35.0 pg    MCHC 36.4 (H) 32.0 - 34.5 g/dL    RDW 16.4 (H) 11.5 - 15.0 %    Platelets 342 130 - 450 k/uL    MPV 9.3 7.0 - 12.0 fL    Neutrophils % 61 43.0 - 80.0 %    Lymphocytes % 28 20.0 - 42.0 %    Monocytes % 7 2.0 - 12.0 %    Eosinophils % 2 0 - 6 %    Basophils % 1 0.0 - 2.0 %    Immature Granulocytes % 0 0.0 - 5.0 %    Neutrophils Absolute 5.36 1.80 - 7.30 k/uL    Lymphocytes Absolute 2.47 1.50 - 4.00 k/uL    Monocytes Absolute 0.65 0.10 - 0.95 k/uL    Eosinophils Absolute 0.18 0.05 - 0.50 k/uL    Basophils Absolute 0.05 0.00 - 0.20 k/uL    Immature Granulocytes Absolute <0.03 0.00 - 0.58 k/uL   Comprehensive Metabolic Panel   Result Value Ref Range    Sodium 140 132 - 146 mmol/L    Potassium 3.7 3.5 - 5.0 mmol/L    Chloride 104 98 - 107 mmol/L    CO2 24 22 - 29 mmol/L    Anion Gap 12 7 - 16 mmol/L    Glucose 97 74 - 99 mg/dL    BUN 9 6 - 20 mg/dL    Creatinine 0.8 0.50 - 1.00 mg/dL    Est, Glom Filt Rate >90 >60 mL/min/1.73m2    Calcium 9.3 8.6 - 10.2 mg/dL    Total Protein 7.6 6.4 - 8.3 g/dL    Albumin 4.3 3.5 - 5.2 g/dL    Total Bilirubin 0.8 0.0 - 1.2 mg/dL    Alkaline Phosphatase 40 35 - 104 U/L    ALT 10 0 - 32 U/L    AST 16 0 - 31 U/L   Urinalysis   Result Value Ref Range    Color, UA Yellow Yellow    Turbidity UA Clear Clear    Glucose, Ur NEGATIVE NEGATIVE mg/dL    Bilirubin, Urine SMALL (A) NEGATIVE    Ketones, Urine NEGATIVE NEGATIVE mg/dL    Specific Gravity, UA >1.030 (H) 1.005 - 1.030    Urine Hgb LARGE (A)  vaginal bleeding patient was seen here 6 days ago on 6/8/2025 and had a ultrasound that showed live IUP at 6 weeks 1 day.  She states she was just having spotting then she was having heavier bleeding more like it.  Now with some bright red blood and some small clots.  No tissue passing.      Physical exam findings: No abdominal tenderness stable vital signs; pelvic exam showed small amount of dark blood.  No clots or tissue passing.    Differential diagnosis (includes but not limited to):  Threatened miscarriage vaginal bleeding pregnancy      Chronic conditions:  has a past medical history of Migraine.          ED Course Summary:(labs and imaging reviewed, interventions, reassessment, consults,shared decision making with patient, disposition)    CBC was ordered as part of my assessment for possible infection, anemia or thrombocytopenia. BMP to assess electrolytes, kidney function or any metabolic derangements. Urinalysis to evaluate for a UTI. OB ultrasound to evaluate current pregnancy and to rule out miscarriage/ectopic pregnancy.      Patient is CBC that was normal hemoglobin was 12 white count was 8 chemistry was normal urinalysis is negative for acute infection.  Beta-hCG was 400 it was down from 6006 days ago.  Blood type is A+.  Patient does not require RhoGAM.  Pelvic exam os was closed minimal bleeding no clots passing no tissue passing from the os ultrasound unfortunately this point showed no evidence of intrauterine pregnancy.  Ultrasound that was done 6 days ago patient does appear to have miscarried.  There was a live IUP seen on ultrasound 6 days ago.  Patient was made aware of this finding that she had miscarried.  She actually stated that she was relieved.  She was not ready for this pregnancy.  She was informed that she needs to follow-up outpatient with OB/GYN to make sure the pregnancy hormone level improves down to 0 and that she has close outpatient follow-up.  She understands.  I spoke to

## 2025-06-16 ENCOUNTER — TELEPHONE (OUTPATIENT)
Dept: ADMINISTRATIVE | Age: 20
End: 2025-06-16

## 2025-07-18 ENCOUNTER — HOSPITAL ENCOUNTER (EMERGENCY)
Age: 20
Discharge: HOME | End: 2025-07-18
Payer: COMMERCIAL

## 2025-07-18 ENCOUNTER — APPOINTMENT (OUTPATIENT)
Dept: GENERAL RADIOLOGY | Age: 20
End: 2025-07-18
Payer: COMMERCIAL

## 2025-07-18 VITALS
TEMPERATURE: 97.8 F | OXYGEN SATURATION: 100 % | WEIGHT: 120 LBS | DIASTOLIC BLOOD PRESSURE: 82 MMHG | RESPIRATION RATE: 18 BRPM | HEART RATE: 96 BPM | BODY MASS INDEX: 21.26 KG/M2 | SYSTOLIC BLOOD PRESSURE: 114 MMHG

## 2025-07-18 DIAGNOSIS — R07.89 LEFT-SIDED CHEST WALL PAIN: Primary | ICD-10-CM

## 2025-07-18 DIAGNOSIS — R07.9 CHEST PAIN WITH LOW RISK FOR CARDIAC ETIOLOGY: ICD-10-CM

## 2025-07-18 LAB
ALBUMIN SERPL-MCNC: 4.1 G/DL (ref 3.5–5.2)
ALP SERPL-CCNC: 46 U/L (ref 35–104)
ALT SERPL-CCNC: 17 U/L (ref 0–35)
ANION GAP SERPL CALCULATED.3IONS-SCNC: 14 MMOL/L (ref 7–16)
AST SERPL-CCNC: 26 U/L (ref 0–35)
BASOPHILS # BLD: 0.05 K/UL (ref 0–0.2)
BASOPHILS NFR BLD: 1 % (ref 0–2)
BILIRUB SERPL-MCNC: 1.2 MG/DL (ref 0–1.2)
BUN SERPL-MCNC: 9 MG/DL (ref 6–20)
CALCIUM SERPL-MCNC: 9.3 MG/DL (ref 8.6–10)
CHLORIDE SERPL-SCNC: 105 MMOL/L (ref 98–107)
CO2 SERPL-SCNC: 23 MMOL/L (ref 22–29)
CREAT SERPL-MCNC: 0.9 MG/DL (ref 0.5–1)
D-DIMER QUANTITATIVE: <200 NG/ML DDU (ref 0–230)
EOSINOPHIL # BLD: 0.1 K/UL (ref 0.05–0.5)
EOSINOPHILS RELATIVE PERCENT: 1 % (ref 0–6)
ERYTHROCYTE [DISTWIDTH] IN BLOOD BY AUTOMATED COUNT: 16 % (ref 11.5–15)
GFR, ESTIMATED: >90 ML/MIN/1.73M2
GLUCOSE SERPL-MCNC: 93 MG/DL (ref 74–99)
HCG, URINE, POC: NEGATIVE
HCT VFR BLD AUTO: 32.8 % (ref 34–48)
HGB BLD-MCNC: 11.7 G/DL (ref 11.5–15.5)
IMM GRANULOCYTES # BLD AUTO: 0.03 K/UL (ref 0–0.58)
IMM GRANULOCYTES NFR BLD: 0 % (ref 0–5)
LYMPHOCYTES NFR BLD: 2.58 K/UL (ref 1.5–4)
LYMPHOCYTES RELATIVE PERCENT: 29 % (ref 20–42)
Lab: NORMAL
MAGNESIUM SERPL-MCNC: 1.9 MG/DL (ref 1.6–2.6)
MCH RBC QN AUTO: 28 PG (ref 26–35)
MCHC RBC AUTO-ENTMCNC: 35.7 G/DL (ref 32–34.5)
MCV RBC AUTO: 78.5 FL (ref 80–99.9)
MONOCYTES NFR BLD: 0.52 K/UL (ref 0.1–0.95)
MONOCYTES NFR BLD: 6 % (ref 2–12)
NEGATIVE QC PASS/FAIL: NORMAL
NEUTROPHILS NFR BLD: 63 % (ref 43–80)
NEUTS SEG NFR BLD: 5.69 K/UL (ref 1.8–7.3)
PLATELET # BLD AUTO: 361 K/UL (ref 130–450)
PMV BLD AUTO: 9.6 FL (ref 7–12)
POSITIVE QC PASS/FAIL: NORMAL
POTASSIUM SERPL-SCNC: 3.7 MMOL/L (ref 3.5–5.1)
PROT SERPL-MCNC: 7.2 G/DL (ref 6.4–8.3)
RBC # BLD AUTO: 4.18 M/UL (ref 3.5–5.5)
SODIUM SERPL-SCNC: 142 MMOL/L (ref 136–145)
TROPONIN I SERPL HS-MCNC: <6 NG/L (ref 0–14)
WBC OTHER # BLD: 9 K/UL (ref 4.5–11.5)

## 2025-07-18 PROCEDURE — 96374 THER/PROPH/DIAG INJ IV PUSH: CPT

## 2025-07-18 PROCEDURE — 99285 EMERGENCY DEPT VISIT HI MDM: CPT

## 2025-07-18 PROCEDURE — 85379 FIBRIN DEGRADATION QUANT: CPT

## 2025-07-18 PROCEDURE — 71046 X-RAY EXAM CHEST 2 VIEWS: CPT

## 2025-07-18 PROCEDURE — 84484 ASSAY OF TROPONIN QUANT: CPT

## 2025-07-18 PROCEDURE — 93005 ELECTROCARDIOGRAM TRACING: CPT

## 2025-07-18 PROCEDURE — 83735 ASSAY OF MAGNESIUM: CPT

## 2025-07-18 PROCEDURE — 85025 COMPLETE CBC W/AUTO DIFF WBC: CPT

## 2025-07-18 PROCEDURE — 6360000002 HC RX W HCPCS

## 2025-07-18 PROCEDURE — 80053 COMPREHEN METABOLIC PANEL: CPT

## 2025-07-18 RX ORDER — KETOROLAC TROMETHAMINE 30 MG/ML
15 INJECTION, SOLUTION INTRAMUSCULAR; INTRAVENOUS ONCE
Status: COMPLETED | OUTPATIENT
Start: 2025-07-18 | End: 2025-07-18

## 2025-07-18 RX ADMIN — KETOROLAC TROMETHAMINE 15 MG: 30 INJECTION, SOLUTION INTRAMUSCULAR at 17:14

## 2025-07-18 ASSESSMENT — PAIN DESCRIPTION - FREQUENCY: FREQUENCY: CONTINUOUS

## 2025-07-18 ASSESSMENT — PAIN - FUNCTIONAL ASSESSMENT: PAIN_FUNCTIONAL_ASSESSMENT: 0-10

## 2025-07-18 ASSESSMENT — PAIN DESCRIPTION - ONSET: ONSET: ON-GOING

## 2025-07-18 ASSESSMENT — PAIN SCALES - GENERAL
PAINLEVEL_OUTOF10: 7
PAINLEVEL_OUTOF10: 7

## 2025-07-18 ASSESSMENT — PAIN DESCRIPTION - ORIENTATION
ORIENTATION: LEFT
ORIENTATION: LEFT

## 2025-07-18 ASSESSMENT — PAIN DESCRIPTION - LOCATION
LOCATION: CHEST
LOCATION: CHEST

## 2025-07-18 ASSESSMENT — PAIN DESCRIPTION - DESCRIPTORS: DESCRIPTORS: ACHING;SHARP

## 2025-07-18 ASSESSMENT — PAIN DESCRIPTION - PAIN TYPE: TYPE: ACUTE PAIN

## 2025-07-18 NOTE — ED PROVIDER NOTES
Independent NAYA Visit.         [facility] EMERGENCY DEPARTMENT  ED  Encounter Note  Admit Date/RoomTime: 2025  6:29 PM  ED Room: PR1/PR1  NAME: Keagan Rivera  : 2005  MRN: [id number]  PCP: No primary care provider on file.    CHIEF COMPLAINT     Chest Pain (7/10 stabbing aching, started while laying down. Denies heart hx, )    HISTORY OF PRESENT ILLNESS        Keagan Rivera is a 20 y.o. female, with a past medical hx of  has a past medical history of Migraine. who presents to the ED by ambulance for evaluation of left-sided stabbing/aching chest pain, beginning yesterday. The complaint has been remaining constant and are moderate in severity.  Patient reports that began yesterday, worse when she moves her arm as well as her close is on the left side of her chest.  She denies any associated shortness of breath, abdominal pain, nausea, vomiting, fevers, chills, bodies, asymmetrical leg pain/swelling, hemoptysis, recent prolonged travel, personal history cancer, birth control use.  Denies any history of PE or DVT.  Denies any other associate symptoms or complaints.    REVIEW OF SYSTEMS     Pertinent positives and negatives are stated within HPI, all other systems reviewed and are negative.    Past Medical History:  has a past medical history of Migraine.  Surgical History:  has no past surgical history on file.  Social History:  reports that she has been smoking cigarettes. She has never used smokeless tobacco. She reports that she does not currently use alcohol. She reports current drug use. Drug: Marijuana (Weed).  Family History: family history includes No Known Problems in her father and mother.   Allergies: Patient has no known allergies.    CURRENT MEDICATIONS       Discharge Medication List as of 2025  6:43 PM        CONTINUE these medications which have NOT CHANGED    Details   ibuprofen (ADVIL;MOTRIN) 800 MG tablet Take 1 tablet by mouth every 8 hours, Disp-120  today's visit with the patient in addition to providing specific details for the plan of care and counseling regarding the diagnosis and prognosis.  Questions are answered at this time and are agreeable with the plan.    ASSESSMENT     1. Left-sided chest wall pain    2. Chest pain with low risk for cardiac etiology        DISPOSITION   Discharged home.  Patient condition is good    Discharge Instructions:   Patient referred to  [facility]  Number: [phone]  Call   To establish a primary care physician.    [facility] Emergency Department  [facility] [address]  [phone]  Go in 3 days  If symptoms worsen, such as severe chest pain, difficulty breathing, or fevers.    NEW MEDICATIONS     Discharge Medication List as of 7/18/2025  6:43 PM        Electronically signed by Antolin Leblanc PA-C   DD: 7/18/25  **This report was transcribed using voice recognition software. Every effort was made to ensure accuracy; however, inadvertent computerized transcription errors may be present.  END OF ED PROVIDER NOTE         Antolin Leblanc PA-C  07/18/25 2029

## 2025-07-18 NOTE — DISCHARGE INSTRUCTIONS
Thank you for attending [facility] Emergency Department.  There are a few things you need to be reminded about upon discharge:    As we discussed, your workup today is reassuring.  I suspect this is more musculoskeletal in nature given your physical exam findings and reassuring workup.  I do recommend Tylenol and ibuprofen..  Please follow-up with your PCP referral to establish primary care physician if you do not have a PCP.  If for what ever reason you develop worsening symptoms, such as severe chest pain, difficulty breathing, fever, please do not hesitate to return to the emergency department immediately for further evaluation.

## 2025-07-20 LAB
EKG ATRIAL RATE: 90 BPM
EKG P AXIS: 80 DEGREES
EKG P-R INTERVAL: 148 MS
EKG Q-T INTERVAL: 358 MS
EKG QRS DURATION: 70 MS
EKG QTC CALCULATION (BAZETT): 437 MS
EKG R AXIS: 78 DEGREES
EKG T AXIS: 49 DEGREES
EKG VENTRICULAR RATE: 90 BPM

## 2025-07-20 PROCEDURE — 93010 ELECTROCARDIOGRAM REPORT: CPT | Performed by: INTERNAL MEDICINE
